# Patient Record
Sex: MALE | Race: WHITE | NOT HISPANIC OR LATINO | URBAN - METROPOLITAN AREA
[De-identification: names, ages, dates, MRNs, and addresses within clinical notes are randomized per-mention and may not be internally consistent; named-entity substitution may affect disease eponyms.]

---

## 2018-01-27 ENCOUNTER — EMERGENCY (EMERGENCY)
Facility: HOSPITAL | Age: 48
LOS: 1 days | Discharge: OTHER INSTITUTION | End: 2018-01-27
Attending: EMERGENCY MEDICINE | Admitting: EMERGENCY MEDICINE
Payer: MEDICAID

## 2018-01-27 VITALS
HEART RATE: 86 BPM | SYSTOLIC BLOOD PRESSURE: 146 MMHG | HEIGHT: 70 IN | TEMPERATURE: 98 F | WEIGHT: 173.06 LBS | RESPIRATION RATE: 18 BRPM | DIASTOLIC BLOOD PRESSURE: 78 MMHG | OXYGEN SATURATION: 98 %

## 2018-01-27 DIAGNOSIS — R69 ILLNESS, UNSPECIFIED: ICD-10-CM

## 2018-01-27 DIAGNOSIS — F31.63 BIPOLAR DISORDER, CURRENT EPISODE MIXED, SEVERE, WITHOUT PSYCHOTIC FEATURES: ICD-10-CM

## 2018-01-27 DIAGNOSIS — F39 UNSPECIFIED MOOD [AFFECTIVE] DISORDER: ICD-10-CM

## 2018-01-27 LAB
ALBUMIN SERPL ELPH-MCNC: 4.7 G/DL — SIGNIFICANT CHANGE UP (ref 3.3–5.2)
ALP SERPL-CCNC: 73 U/L — SIGNIFICANT CHANGE UP (ref 40–120)
ALT FLD-CCNC: 17 U/L — SIGNIFICANT CHANGE UP
AMPHET UR-MCNC: NEGATIVE — SIGNIFICANT CHANGE UP
ANION GAP SERPL CALC-SCNC: 14 MMOL/L — SIGNIFICANT CHANGE UP (ref 5–17)
APAP SERPL-MCNC: <7.5 UG/ML — LOW (ref 10–26)
APPEARANCE UR: CLEAR — SIGNIFICANT CHANGE UP
AST SERPL-CCNC: 14 U/L — SIGNIFICANT CHANGE UP
BACTERIA # UR AUTO: ABNORMAL
BARBITURATES UR SCN-MCNC: NEGATIVE — SIGNIFICANT CHANGE UP
BASOPHILS # BLD AUTO: 0 K/UL — SIGNIFICANT CHANGE UP (ref 0–0.2)
BASOPHILS NFR BLD AUTO: 0.2 % — SIGNIFICANT CHANGE UP (ref 0–2)
BENZODIAZ UR-MCNC: NEGATIVE — SIGNIFICANT CHANGE UP
BILIRUB SERPL-MCNC: 0.4 MG/DL — SIGNIFICANT CHANGE UP (ref 0.4–2)
BILIRUB UR-MCNC: NEGATIVE — SIGNIFICANT CHANGE UP
BUN SERPL-MCNC: 21 MG/DL — HIGH (ref 8–20)
CALCIUM SERPL-MCNC: 9.8 MG/DL — SIGNIFICANT CHANGE UP (ref 8.6–10.2)
CHLORIDE SERPL-SCNC: 98 MMOL/L — SIGNIFICANT CHANGE UP (ref 98–107)
CO2 SERPL-SCNC: 26 MMOL/L — SIGNIFICANT CHANGE UP (ref 22–29)
COCAINE METAB.OTHER UR-MCNC: NEGATIVE — SIGNIFICANT CHANGE UP
COLOR SPEC: YELLOW — SIGNIFICANT CHANGE UP
CREAT SERPL-MCNC: 0.94 MG/DL — SIGNIFICANT CHANGE UP (ref 0.5–1.3)
DIFF PNL FLD: NEGATIVE — SIGNIFICANT CHANGE UP
EOSINOPHIL # BLD AUTO: 0.5 K/UL — SIGNIFICANT CHANGE UP (ref 0–0.5)
EOSINOPHIL NFR BLD AUTO: 3.7 % — SIGNIFICANT CHANGE UP (ref 0–5)
EPI CELLS # UR: NEGATIVE — SIGNIFICANT CHANGE UP
ETHANOL SERPL-MCNC: <10 MG/DL — SIGNIFICANT CHANGE UP
GLUCOSE SERPL-MCNC: 95 MG/DL — SIGNIFICANT CHANGE UP (ref 70–115)
GLUCOSE UR QL: NEGATIVE MG/DL — SIGNIFICANT CHANGE UP
HCT VFR BLD CALC: 42.6 % — SIGNIFICANT CHANGE UP (ref 42–52)
HGB BLD-MCNC: 14.7 G/DL — SIGNIFICANT CHANGE UP (ref 14–18)
KETONES UR-MCNC: NEGATIVE — SIGNIFICANT CHANGE UP
LEUKOCYTE ESTERASE UR-ACNC: NEGATIVE — SIGNIFICANT CHANGE UP
LYMPHOCYTES # BLD AUTO: 16 % — LOW (ref 20–55)
LYMPHOCYTES # BLD AUTO: 2.3 K/UL — SIGNIFICANT CHANGE UP (ref 1–4.8)
MCHC RBC-ENTMCNC: 30.1 PG — SIGNIFICANT CHANGE UP (ref 27–31)
MCHC RBC-ENTMCNC: 34.5 G/DL — SIGNIFICANT CHANGE UP (ref 32–36)
MCV RBC AUTO: 87.3 FL — SIGNIFICANT CHANGE UP (ref 80–94)
METHADONE UR-MCNC: NEGATIVE — SIGNIFICANT CHANGE UP
MONOCYTES # BLD AUTO: 0.9 K/UL — HIGH (ref 0–0.8)
MONOCYTES NFR BLD AUTO: 6.4 % — SIGNIFICANT CHANGE UP (ref 3–10)
NEUTROPHILS # BLD AUTO: 10.3 K/UL — HIGH (ref 1.8–8)
NEUTROPHILS NFR BLD AUTO: 72.9 % — SIGNIFICANT CHANGE UP (ref 37–73)
NITRITE UR-MCNC: NEGATIVE — SIGNIFICANT CHANGE UP
OPIATES UR-MCNC: NEGATIVE — SIGNIFICANT CHANGE UP
PCP SPEC-MCNC: SIGNIFICANT CHANGE UP
PCP UR-MCNC: NEGATIVE — SIGNIFICANT CHANGE UP
PH UR: 6 — SIGNIFICANT CHANGE UP (ref 5–8)
PLATELET # BLD AUTO: 304 K/UL — SIGNIFICANT CHANGE UP (ref 150–400)
POTASSIUM SERPL-MCNC: 4.2 MMOL/L — SIGNIFICANT CHANGE UP (ref 3.5–5.3)
POTASSIUM SERPL-SCNC: 4.2 MMOL/L — SIGNIFICANT CHANGE UP (ref 3.5–5.3)
PROT SERPL-MCNC: 7.4 G/DL — SIGNIFICANT CHANGE UP (ref 6.6–8.7)
PROT UR-MCNC: 15 MG/DL
RBC # BLD: 4.88 M/UL — SIGNIFICANT CHANGE UP (ref 4.6–6.2)
RBC # FLD: 12.3 % — SIGNIFICANT CHANGE UP (ref 11–15.6)
RBC CASTS # UR COMP ASSIST: SIGNIFICANT CHANGE UP /HPF (ref 0–4)
SALICYLATES SERPL-MCNC: <0.6 MG/DL — LOW (ref 10–20)
SODIUM SERPL-SCNC: 138 MMOL/L — SIGNIFICANT CHANGE UP (ref 135–145)
SP GR SPEC: 1.02 — SIGNIFICANT CHANGE UP (ref 1.01–1.02)
THC UR QL: NEGATIVE — SIGNIFICANT CHANGE UP
TSH SERPL-MCNC: 4.1 UIU/ML — SIGNIFICANT CHANGE UP (ref 0.27–4.2)
UROBILINOGEN FLD QL: NEGATIVE MG/DL — SIGNIFICANT CHANGE UP
WBC # BLD: 14.2 K/UL — HIGH (ref 4.8–10.8)
WBC # FLD AUTO: 14.2 K/UL — HIGH (ref 4.8–10.8)
WBC UR QL: SIGNIFICANT CHANGE UP

## 2018-01-27 PROCEDURE — 84443 ASSAY THYROID STIM HORMONE: CPT

## 2018-01-27 PROCEDURE — 36415 COLL VENOUS BLD VENIPUNCTURE: CPT

## 2018-01-27 PROCEDURE — 80053 COMPREHEN METABOLIC PANEL: CPT

## 2018-01-27 PROCEDURE — 80307 DRUG TEST PRSMV CHEM ANLYZR: CPT

## 2018-01-27 PROCEDURE — 99285 EMERGENCY DEPT VISIT HI MDM: CPT

## 2018-01-27 PROCEDURE — 81001 URINALYSIS AUTO W/SCOPE: CPT

## 2018-01-27 PROCEDURE — 93005 ELECTROCARDIOGRAM TRACING: CPT

## 2018-01-27 PROCEDURE — 99285 EMERGENCY DEPT VISIT HI MDM: CPT | Mod: 25

## 2018-01-27 PROCEDURE — 93010 ELECTROCARDIOGRAM REPORT: CPT

## 2018-01-27 PROCEDURE — 85027 COMPLETE CBC AUTOMATED: CPT

## 2018-01-27 PROCEDURE — 90792 PSYCH DIAG EVAL W/MED SRVCS: CPT

## 2018-01-27 RX ORDER — QUETIAPINE FUMARATE 200 MG/1
200 TABLET, FILM COATED ORAL ONCE
Qty: 0 | Refills: 0 | Status: COMPLETED | OUTPATIENT
Start: 2018-01-27 | End: 2018-01-27

## 2018-01-27 RX ADMIN — QUETIAPINE FUMARATE 200 MILLIGRAM(S): 200 TABLET, FILM COATED ORAL at 22:50

## 2018-01-27 NOTE — ED BEHAVIORAL HEALTH ASSESSMENT NOTE - HPI (INCLUDE ILLNESS QUALITY, SEVERITY, DURATION, TIMING, CONTEXT, MODIFYING FACTORS, ASSOCIATED SIGNS AND SYMPTOMS)
Pt alert and awake X 4; arrived c/o +SI and suicidal attempt. tried cutting his arm, superficial LAC to left arm, has a plan to cut himself. denies alcohol or drugs.  suicidal attempt 47-year-old single, non-caregiver, unemployed, on SSD; -American male; domiciles in a motel; with a self-reported history of bipolar disorder and 6 prior suicidal attempts; with a self-reported history of only two prior psychiatric admissions at [facility] last month (from 12/4/17 until 12/14/17 and from 12/21/17 until 1/4/18). Self-referred to the ED; came via bus; c/o +SI and suicidal attempt. States he tried cutting his arm today, superficial LAC to left arm, has a plan to cut himself; reports he took 10 Prozac and 6 vistaril, and states: "I tried to kill myself".

## 2018-01-27 NOTE — ED BEHAVIORAL HEALTH ASSESSMENT NOTE - PRIMARY DX
Mood disorder Bipolar disorder, current episode mixed, severe, without psychotic features Deferred condition on axis II

## 2018-01-27 NOTE — ED ADULT NURSE NOTE - OBJECTIVE STATEMENT
Patient reports he was discharged today Patient reports he was discharged today from [facility] after being there since Wednesday. Stated he was trying to get help for suicidal ideation after using knife to superficially cutting L forearm, but they discharged him "too early" and he wasn't "feeling any better". Reports he went to visit his mother, and she suggested he come to [place] to be evaluated. Patient reports he cut him self on bus on the way to hospital and took "10 Prozac and 6 Vistaril" pills. Reports he still has suicidal ideation, and would take an overdose of his medication. Denied any intent to act on plan while in the hospital. Reports vague auditory hallucinations periodically, and they tell him "just do it, just kill yourself if you're going to..." Evaluating for appropriate disposition.

## 2018-01-27 NOTE — ED BEHAVIORAL HEALTH ASSESSMENT NOTE - DESCRIPTION
+SI HTN See HPI Single, unemployed, non-caregiver; with a history of bipolar; non-compliant with outpatient follow-up.

## 2018-01-27 NOTE — ED BEHAVIORAL HEALTH ASSESSMENT NOTE - SUMMARY
Pt alert and awake X 4; arrived c/o +SI and suicidal attempt. tried cutting his arm, superficial LAC to left arm, has a plan to cut himself. denies alcohol or drugs.  suicidal attempt.      Patient is an =acute risk to self at this time, and require in[patient admission for safety. 47-year-old single, non-caregiver, unemployed, on SSD; -American male; domiciles in a motel; with a self-reported history of bipolar disorder and 6 prior suicidal attempts; with a self-reported history of only two prior psychiatric admissions at [facility] last month (from 12/4/17 until 12/14/17 and from 12/21/17 until 1/4/18). Self-referred to the ED; came via bus; c/o +SI and suicidal attempt. States he tried cutting his arm today, superficial LAC to left arm, has a plan to cut himself; reports he took 10 Prozac and 6 vistaril, and states: "I tried to kill myself".      Patient is an acute risk to self at this time, and require inpatient admission for safety.

## 2018-01-27 NOTE — ED ADULT TRIAGE NOTE - CHIEF COMPLAINT QUOTE
pt alert and awake x3 arrived with SI and attempt. tried cutting his arm, superficial LAC to left arm, has a plan to cut himself. denies alcohol or drugs.

## 2018-01-27 NOTE — ED BEHAVIORAL HEALTH ASSESSMENT NOTE - DETAILS
[facility]; was discharged on 1/4/18. See HPI N/A Reports he took 10 Prozac and 6 vistaril, and states: "I tried to kill myself".

## 2018-01-27 NOTE — ED BEHAVIORAL HEALTH ASSESSMENT NOTE - AXIS IV
Problems with interaction with legal system/Problems with primary support/Occupational problems/Economic problems/Problem related to social environment/Housing problems

## 2018-01-27 NOTE — ED STATDOCS - OBJECTIVE STATEMENT
This is a 47 year old male with hx of depression presenting to the ED c/o worsening depression and self-mutilation today. Pt was recently hospitalized at [place] for suicidal ideation, discharged yesterday. Pt endorses persistent suicidal ideation and today used a knife to cut his L wrist. Denies EtOH use or any other illicit drug use. Currently taking Prozac. Pt currently lives with his mother.

## 2018-01-27 NOTE — ED ADULT NURSE REASSESSMENT NOTE - NS ED NURSE REASSESS COMMENT FT1
Patient has been resting comfortably, watched TV and ate dinner. Accepted meds as ordered. Made aware he has been accepted for transfer to [facility], expressed understanding and intent to cooperate with transfer process. Report called to RN on unit, one [place], after EMS called for transport. No distress noted, continuing to monitor and assess.

## 2018-01-28 ENCOUNTER — INPATIENT (INPATIENT)
Facility: HOSPITAL | Age: 48
LOS: 10 days | Discharge: HOME | End: 2018-02-08
Attending: PSYCHIATRY & NEUROLOGY | Admitting: PSYCHIATRY & NEUROLOGY
Payer: MEDICAID

## 2018-01-28 VITALS
OXYGEN SATURATION: 98 % | DIASTOLIC BLOOD PRESSURE: 80 MMHG | SYSTOLIC BLOOD PRESSURE: 127 MMHG | RESPIRATION RATE: 18 BRPM | HEART RATE: 90 BPM | TEMPERATURE: 98 F

## 2018-01-28 VITALS — TEMPERATURE: 98 F | HEIGHT: 70 IN | WEIGHT: 173.06 LBS

## 2018-01-28 DIAGNOSIS — F33.1 MAJOR DEPRESSIVE DISORDER, RECURRENT, MODERATE: ICD-10-CM

## 2018-01-28 RX ORDER — HYDROXYZINE HCL 10 MG
50 TABLET ORAL EVERY 4 HOURS
Qty: 0 | Refills: 0 | Status: DISCONTINUED | OUTPATIENT
Start: 2018-01-28 | End: 2018-01-28

## 2018-01-28 RX ORDER — LANOLIN ALCOHOL/MO/W.PET/CERES
3 CREAM (GRAM) TOPICAL AT BEDTIME
Qty: 0 | Refills: 0 | Status: DISCONTINUED | OUTPATIENT
Start: 2018-01-28 | End: 2018-02-08

## 2018-01-28 RX ORDER — AMLODIPINE BESYLATE 2.5 MG/1
2.5 TABLET ORAL AT BEDTIME
Qty: 0 | Refills: 0 | Status: DISCONTINUED | OUTPATIENT
Start: 2018-01-28 | End: 2018-01-29

## 2018-01-28 RX ADMIN — AMLODIPINE BESYLATE 2.5 MILLIGRAM(S): 2.5 TABLET ORAL at 20:42

## 2018-01-28 RX ADMIN — Medication 2 MILLIGRAM(S): at 14:00

## 2018-01-28 RX ADMIN — Medication 2 MILLIGRAM(S): at 21:51

## 2018-01-28 RX ADMIN — Medication 3 MILLIGRAM(S): at 21:51

## 2018-01-28 NOTE — ED BEHAVIORAL HEALTH NOTE - BEHAVIORAL HEALTH NOTE
SW Note - late Entry : 1970  DOS:  2018  TRX to [facility]  [address] [place], accepted by Dr. Jared Diaz [facility] on , Attending Dr Gregorio Aguero  Medicare: [id number] no auth needed.    pt transported by [facility] without incident.

## 2018-01-28 NOTE — CHART NOTE - NSCHARTNOTEFT_GEN_A_CORE
Screening Medical Evaluation  Patient Admitted from: [facility] admitting diagnosis: Moderate episode of recurrent major depressive disorder  (PMH) Depression    PAST MEDICAL & SURGICAL HISTORY:  Depression        Allergies    No Known Allergies    Intolerances        Social History:   Denies EtOH use or any other illicit drug use.   Pt currently lives with his mother.    FAMILY HISTORY:      MEDICATIONS  (STANDING):  amLODIPine   Tablet 2.5 milliGRAM(s) Oral at bedtime    MEDICATIONS  (PRN):  LORazepam     Tablet 2 milliGRAM(s) Oral every 6 hours PRN Agitation  melatonin. 3 milliGRAM(s) Oral at bedtime PRN Insomnia        CAPILLARY BLOOD GLUCOSE        I&O's Summary    Vital Signs Last 24 Hrs  T(C): 36.3 (2018 08:17), Max: 36.8 (2018 19:29)  T(F): 97.3 (2018 08:17), Max: 98.2 (2018 19:29)  HR: 90 (2018 00:26) (75 - 90)  BP: 127/80 (2018 00:26) (117/77 - 127/80)  BP(mean): --  RR: 18 (2018 00:26) (18 - 18)  SpO2: 98% (2018 00:26) (98% - 99%)      PHYSICAL EXAM:  GENERAL: NAD, well-developed  HEAD:  Atraumatic, Normocephalic  EYES: EOMI, PERRLA, conjunctiva and sclera clear  NECK: Supple, No JVD  CHEST/LUNG: Clear to auscultation bilaterally; No wheeze  HEART: Regular rate and rhythm; No murmurs, rubs, or gallops  ABDOMEN: Soft, Nontender, Nondistended; Bowel sounds present  EXTREMITIES:  2+ Peripheral Pulses, No clubbing, cyanosis, or edema  PSYCH: AAOx3  NEUROLOGY: non-focal  SKIN: No rashes or lesions    LABS:                        14.7   14.2  )-----------( 304      ( 2018 20:34 )             42.6         138  |  98  |  21.0<H>  ----------------------------<  95  4.2   |  26.0  |  0.94    Ca    9.8      2018 20:34    TPro  7.4  /  Alb  4.7  /  TBili  0.4  /  DBili  x   /  AST  14  /  ALT  17  /  AlkPhos  73            Urinalysis Basic - ( 2018 16:25 )    Color: Yellow / Appearance: Clear / S.020 / pH: x  Gluc: x / Ketone: Negative  / Bili: Negative / Urobili: Negative mg/dL   Blood: x / Protein: 15 mg/dL / Nitrite: Negative   Leuk Esterase: Negative / RBC: 0-2 /HPF / WBC 0-2   Sq Epi: x / Non Sq Epi: Negative / Bacteria: Occasional        RADIOLOGY & ADDITIONAL TESTS:    Assessment and Plan:  This is a 47 year old male with hx of depression presented to [facility] ED with complaints of  worsening depression and self-mutilation. Pt was recently hospitalized at [place] for suicidal ideation, discharged yesterday. As per chart review, pt endorsed persistent suicidal ideation and today used a knife to cut his L wrist. This is a 47 year old male with hx of depression presenting to the ED c/o worsening depression and self-mutilation today. Pt was recently hospitalized at [place] for suicidal ideation, discharged yesterday. Pt endorses persistent suicidal ideation and today used a knife to cut his L wrist. Medical hx: Screening Medical Evaluation  Patient Admitted from: [facility] admitting diagnosis: Moderate episode of recurrent major depressive disorder  (PMH) Depression    PAST MEDICAL & SURGICAL HISTORY:  Depression  Hypertension     Allergies    No Known Allergies    Intolerances    Social History:   Denies EtOH use or any other illicit drug use.  Quit cigarettes in October  Pt currently lives with his mother.    FAMILY HISTORY: maternal:  bipolar disorder     paternal:  unknown      MEDICATIONS  (STANDING):  amLODIPine   Tablet 2.5 milliGRAM(s) Oral at bedtime    MEDICATIONS  (PRN):  LORazepam     Tablet 2 milliGRAM(s) Oral every 6 hours PRN Agitation  melatonin. 3 milliGRAM(s) Oral at bedtime PRN Insomnia      CAPILLARY BLOOD GLUCOSE    I&O's Summary    Vital Signs Last 24 Hrs  T(C): 36.3 (2018 08:17), Max: 36.8 (2018 19:29)  T(F): 97.3 (2018 08:17), Max: 98.2 (2018 19:29)  HR: 90 (2018 00:26) (75 - 90)  BP: 127/80 (2018 00:26) (117/77 - 127/80)  BP(mean): --  RR: 18 (2018 00:26) (18 - 18)  SpO2: 98% (2018 00:26) (98% - 99%)      PHYSICAL EXAM:  GENERAL: NAD, well-developed  HEAD:  Atraumatic, Normocephalic  EYES: EOMI, PERRLA, conjunctiva and sclera clear  NECK: Supple, No JVD  CHEST/LUNG: Clear to auscultation bilaterally; No wheeze  HEART: Regular rate and rhythm; No murmurs, rubs, or gallops  ABDOMEN: Soft, Nontender, Nondistended; Bowel sounds present  EXTREMITIES:  2+ Peripheral Pulses, No clubbing, cyanosis, or edema  PSYCH: AAOx3  NEUROLOGY: non-focal  SKIN: left arm with multiple superficial self-inflicted lacerations     LABS:                        14.7   14.2  )-----------( 304      ( 2018 20:34 )             42.6         138  |  98  |  21.0<H>  ----------------------------<  95  4.2   |  26.0  |  0.94    Ca    9.8      2018 20:34    TPro  7.4  /  Alb  4.7  /  TBili  0.4  /  DBili  x   /  AST  14  /  ALT  17  /  AlkPhos  73            Urinalysis Basic - ( 2018 16:25 )    Color: Yellow / Appearance: Clear / S.020 / pH: x  Gluc: x / Ketone: Negative  / Bili: Negative / Urobili: Negative mg/dL   Blood: x / Protein: 15 mg/dL / Nitrite: Negative   Leuk Esterase: Negative / RBC: 0-2 /HPF / WBC 0-2   Sq Epi: x / Non Sq Epi: Negative / Bacteria: Occasional        RADIOLOGY & ADDITIONAL TESTS:    Assessment and Plan:  This is a 47 year old male with hx of depression presented to [facility] ED with complaints of  worsening depression and self-mutilation. Pt was recently hospitalized at [place] for suicidal ideation, discharged yesterday. As per chart review, pt endorsed persistent suicidal ideation and today used a knife to cut his L wrist. Medical history of hypertension, no surgical history.  Physical exam unremarkable.  ROS negative.  Pt denies chest pain, SOB, palpitations, N/V, dizziness, headaches.  Pt transferred to [facility] for further evaluation and safe environment.     MDD - continue with psych recommendations    Hypertension - continue with amlodipine 2.5mg tab qhs

## 2018-01-29 PROCEDURE — 99232 SBSQ HOSP IP/OBS MODERATE 35: CPT

## 2018-01-29 RX ORDER — AMLODIPINE BESYLATE 2.5 MG/1
2.5 TABLET ORAL ONCE
Qty: 0 | Refills: 0 | Status: COMPLETED | OUTPATIENT
Start: 2018-01-29 | End: 2018-01-29

## 2018-01-29 RX ORDER — AMLODIPINE BESYLATE 2.5 MG/1
2.5 TABLET ORAL DAILY
Qty: 0 | Refills: 0 | Status: DISCONTINUED | OUTPATIENT
Start: 2018-01-30 | End: 2018-02-08

## 2018-01-29 RX ORDER — DIVALPROEX SODIUM 500 MG/1
500 TABLET, DELAYED RELEASE ORAL AT BEDTIME
Qty: 0 | Refills: 0 | Status: DISCONTINUED | OUTPATIENT
Start: 2018-01-29 | End: 2018-01-30

## 2018-01-29 RX ADMIN — Medication 1 MILLIGRAM(S): at 18:12

## 2018-01-29 RX ADMIN — AMLODIPINE BESYLATE 2.5 MILLIGRAM(S): 2.5 TABLET ORAL at 12:34

## 2018-01-29 RX ADMIN — Medication 1 MILLIGRAM(S): at 12:30

## 2018-01-29 RX ADMIN — DIVALPROEX SODIUM 500 MILLIGRAM(S): 500 TABLET, DELAYED RELEASE ORAL at 20:40

## 2018-01-29 RX ADMIN — Medication 3 MILLIGRAM(S): at 20:44

## 2018-01-30 PROCEDURE — 99232 SBSQ HOSP IP/OBS MODERATE 35: CPT

## 2018-01-30 RX ORDER — DIVALPROEX SODIUM 500 MG/1
750 TABLET, DELAYED RELEASE ORAL EVERY OTHER DAY
Qty: 0 | Refills: 0 | Status: DISCONTINUED | OUTPATIENT
Start: 2018-01-30 | End: 2018-01-31

## 2018-01-30 RX ADMIN — Medication 3 MILLIGRAM(S): at 21:06

## 2018-01-30 RX ADMIN — Medication 1 MILLIGRAM(S): at 16:38

## 2018-01-30 RX ADMIN — AMLODIPINE BESYLATE 2.5 MILLIGRAM(S): 2.5 TABLET ORAL at 08:47

## 2018-01-30 RX ADMIN — Medication 1 MILLIGRAM(S): at 12:44

## 2018-01-30 RX ADMIN — Medication 1 MILLIGRAM(S): at 21:51

## 2018-01-30 RX ADMIN — Medication 1 MILLIGRAM(S): at 00:14

## 2018-01-31 PROCEDURE — 99232 SBSQ HOSP IP/OBS MODERATE 35: CPT

## 2018-01-31 RX ORDER — DIVALPROEX SODIUM 500 MG/1
500 TABLET, DELAYED RELEASE ORAL THREE TIMES A DAY
Qty: 0 | Refills: 0 | Status: DISCONTINUED | OUTPATIENT
Start: 2018-02-01 | End: 2018-02-02

## 2018-01-31 RX ORDER — DIVALPROEX SODIUM 500 MG/1
500 TABLET, DELAYED RELEASE ORAL AT BEDTIME
Qty: 0 | Refills: 0 | Status: COMPLETED | OUTPATIENT
Start: 2018-01-31 | End: 2018-01-31

## 2018-01-31 RX ORDER — MIRTAZAPINE 45 MG/1
7.5 TABLET, ORALLY DISINTEGRATING ORAL AT BEDTIME
Qty: 0 | Refills: 0 | Status: DISCONTINUED | OUTPATIENT
Start: 2018-01-31 | End: 2018-02-08

## 2018-01-31 RX ADMIN — DIVALPROEX SODIUM 500 MILLIGRAM(S): 500 TABLET, DELAYED RELEASE ORAL at 22:11

## 2018-01-31 RX ADMIN — Medication 1 MILLIGRAM(S): at 16:15

## 2018-01-31 RX ADMIN — MIRTAZAPINE 7.5 MILLIGRAM(S): 45 TABLET, ORALLY DISINTEGRATING ORAL at 22:12

## 2018-01-31 RX ADMIN — Medication 3 MILLIGRAM(S): at 21:32

## 2018-01-31 RX ADMIN — Medication 1 MILLIGRAM(S): at 21:44

## 2018-01-31 RX ADMIN — DIVALPROEX SODIUM 750 MILLIGRAM(S): 500 TABLET, DELAYED RELEASE ORAL at 10:16

## 2018-01-31 RX ADMIN — AMLODIPINE BESYLATE 2.5 MILLIGRAM(S): 2.5 TABLET ORAL at 10:16

## 2018-02-01 PROCEDURE — 99232 SBSQ HOSP IP/OBS MODERATE 35: CPT

## 2018-02-01 RX ORDER — ACETAMINOPHEN 500 MG
650 TABLET ORAL EVERY 6 HOURS
Qty: 0 | Refills: 0 | Status: DISCONTINUED | OUTPATIENT
Start: 2018-02-01 | End: 2018-02-08

## 2018-02-01 RX ORDER — LORATADINE 10 MG/1
10 TABLET ORAL ONCE
Qty: 0 | Refills: 0 | Status: COMPLETED | OUTPATIENT
Start: 2018-02-01 | End: 2018-02-01

## 2018-02-01 RX ORDER — LORATADINE 10 MG/1
10 TABLET ORAL DAILY
Qty: 0 | Refills: 0 | Status: DISCONTINUED | OUTPATIENT
Start: 2018-02-02 | End: 2018-02-08

## 2018-02-01 RX ADMIN — DIVALPROEX SODIUM 500 MILLIGRAM(S): 500 TABLET, DELAYED RELEASE ORAL at 09:39

## 2018-02-01 RX ADMIN — DIVALPROEX SODIUM 500 MILLIGRAM(S): 500 TABLET, DELAYED RELEASE ORAL at 22:06

## 2018-02-01 RX ADMIN — MIRTAZAPINE 7.5 MILLIGRAM(S): 45 TABLET, ORALLY DISINTEGRATING ORAL at 22:06

## 2018-02-01 RX ADMIN — DIVALPROEX SODIUM 500 MILLIGRAM(S): 500 TABLET, DELAYED RELEASE ORAL at 13:29

## 2018-02-01 RX ADMIN — AMLODIPINE BESYLATE 2.5 MILLIGRAM(S): 2.5 TABLET ORAL at 09:39

## 2018-02-01 RX ADMIN — LORATADINE 10 MILLIGRAM(S): 10 TABLET ORAL at 14:34

## 2018-02-02 PROCEDURE — 99232 SBSQ HOSP IP/OBS MODERATE 35: CPT

## 2018-02-02 RX ORDER — DIVALPROEX SODIUM 500 MG/1
500 TABLET, DELAYED RELEASE ORAL AT BEDTIME
Qty: 0 | Refills: 0 | Status: COMPLETED | OUTPATIENT
Start: 2018-02-02 | End: 2018-02-02

## 2018-02-02 RX ORDER — DIVALPROEX SODIUM 500 MG/1
1000 TABLET, DELAYED RELEASE ORAL AT BEDTIME
Qty: 0 | Refills: 0 | Status: DISCONTINUED | OUTPATIENT
Start: 2018-02-03 | End: 2018-02-08

## 2018-02-02 RX ORDER — INFLUENZA VIRUS VACCINE 15; 15; 15; 15 UG/.5ML; UG/.5ML; UG/.5ML; UG/.5ML
0.5 SUSPENSION INTRAMUSCULAR ONCE
Qty: 0 | Refills: 0 | Status: COMPLETED | OUTPATIENT
Start: 2018-02-02 | End: 2018-02-03

## 2018-02-02 RX ORDER — DIVALPROEX SODIUM 500 MG/1
500 TABLET, DELAYED RELEASE ORAL DAILY
Qty: 0 | Refills: 0 | Status: DISCONTINUED | OUTPATIENT
Start: 2018-02-03 | End: 2018-02-08

## 2018-02-02 RX ADMIN — DIVALPROEX SODIUM 500 MILLIGRAM(S): 500 TABLET, DELAYED RELEASE ORAL at 08:45

## 2018-02-02 RX ADMIN — DIVALPROEX SODIUM 500 MILLIGRAM(S): 500 TABLET, DELAYED RELEASE ORAL at 12:48

## 2018-02-02 RX ADMIN — LORATADINE 10 MILLIGRAM(S): 10 TABLET ORAL at 08:45

## 2018-02-02 RX ADMIN — DIVALPROEX SODIUM 500 MILLIGRAM(S): 500 TABLET, DELAYED RELEASE ORAL at 20:55

## 2018-02-02 RX ADMIN — AMLODIPINE BESYLATE 2.5 MILLIGRAM(S): 2.5 TABLET ORAL at 08:45

## 2018-02-02 RX ADMIN — MIRTAZAPINE 7.5 MILLIGRAM(S): 45 TABLET, ORALLY DISINTEGRATING ORAL at 20:56

## 2018-02-03 RX ADMIN — Medication 650 MILLIGRAM(S): at 19:00

## 2018-02-03 RX ADMIN — DIVALPROEX SODIUM 500 MILLIGRAM(S): 500 TABLET, DELAYED RELEASE ORAL at 09:15

## 2018-02-03 RX ADMIN — MIRTAZAPINE 7.5 MILLIGRAM(S): 45 TABLET, ORALLY DISINTEGRATING ORAL at 21:01

## 2018-02-03 RX ADMIN — INFLUENZA VIRUS VACCINE 0.5 MILLILITER(S): 15; 15; 15; 15 SUSPENSION INTRAMUSCULAR at 12:11

## 2018-02-03 RX ADMIN — AMLODIPINE BESYLATE 2.5 MILLIGRAM(S): 2.5 TABLET ORAL at 09:15

## 2018-02-03 RX ADMIN — Medication 650 MILLIGRAM(S): at 20:01

## 2018-02-03 RX ADMIN — DIVALPROEX SODIUM 1000 MILLIGRAM(S): 500 TABLET, DELAYED RELEASE ORAL at 21:01

## 2018-02-03 RX ADMIN — Medication 1 MILLIGRAM(S): at 22:28

## 2018-02-03 RX ADMIN — LORATADINE 10 MILLIGRAM(S): 10 TABLET ORAL at 09:15

## 2018-02-03 RX ADMIN — Medication 650 MILLIGRAM(S): at 13:45

## 2018-02-04 RX ADMIN — AMLODIPINE BESYLATE 2.5 MILLIGRAM(S): 2.5 TABLET ORAL at 10:00

## 2018-02-04 RX ADMIN — DIVALPROEX SODIUM 500 MILLIGRAM(S): 500 TABLET, DELAYED RELEASE ORAL at 10:00

## 2018-02-04 RX ADMIN — MIRTAZAPINE 7.5 MILLIGRAM(S): 45 TABLET, ORALLY DISINTEGRATING ORAL at 21:03

## 2018-02-04 RX ADMIN — DIVALPROEX SODIUM 1000 MILLIGRAM(S): 500 TABLET, DELAYED RELEASE ORAL at 21:03

## 2018-02-04 RX ADMIN — LORATADINE 10 MILLIGRAM(S): 10 TABLET ORAL at 10:00

## 2018-02-05 LAB
ALBUMIN SERPL ELPH-MCNC: 4.3 G/DL — SIGNIFICANT CHANGE UP (ref 3.3–5)
ALP SERPL-CCNC: 61 U/L — SIGNIFICANT CHANGE UP (ref 40–120)
ALT FLD-CCNC: 19 U/L — SIGNIFICANT CHANGE UP (ref 4–41)
AST SERPL-CCNC: 12 U/L — SIGNIFICANT CHANGE UP (ref 4–40)
BASOPHILS # BLD AUTO: 0.05 K/UL — SIGNIFICANT CHANGE UP (ref 0–0.2)
BASOPHILS NFR BLD AUTO: 0.5 % — SIGNIFICANT CHANGE UP (ref 0–2)
BILIRUB SERPL-MCNC: 0.2 MG/DL — SIGNIFICANT CHANGE UP (ref 0.2–1.2)
BUN SERPL-MCNC: 14 MG/DL — SIGNIFICANT CHANGE UP (ref 7–23)
CALCIUM SERPL-MCNC: 9.1 MG/DL — SIGNIFICANT CHANGE UP (ref 8.4–10.5)
CHLORIDE SERPL-SCNC: 102 MMOL/L — SIGNIFICANT CHANGE UP (ref 98–107)
CO2 SERPL-SCNC: 28 MMOL/L — SIGNIFICANT CHANGE UP (ref 22–31)
CREAT SERPL-MCNC: 0.97 MG/DL — SIGNIFICANT CHANGE UP (ref 0.5–1.3)
EOSINOPHIL # BLD AUTO: 0.55 K/UL — HIGH (ref 0–0.5)
EOSINOPHIL NFR BLD AUTO: 5.5 % — SIGNIFICANT CHANGE UP (ref 0–6)
GLUCOSE SERPL-MCNC: 83 MG/DL — SIGNIFICANT CHANGE UP (ref 70–99)
HCT VFR BLD CALC: 42.6 % — SIGNIFICANT CHANGE UP (ref 39–50)
HGB BLD-MCNC: 14.8 G/DL — SIGNIFICANT CHANGE UP (ref 13–17)
IMM GRANULOCYTES # BLD AUTO: 0.14 # — SIGNIFICANT CHANGE UP
IMM GRANULOCYTES NFR BLD AUTO: 1.4 % — SIGNIFICANT CHANGE UP (ref 0–1.5)
LYMPHOCYTES # BLD AUTO: 2.53 K/UL — SIGNIFICANT CHANGE UP (ref 1–3.3)
LYMPHOCYTES # BLD AUTO: 25.5 % — SIGNIFICANT CHANGE UP (ref 13–44)
MCHC RBC-ENTMCNC: 30.8 PG — SIGNIFICANT CHANGE UP (ref 27–34)
MCHC RBC-ENTMCNC: 34.7 % — SIGNIFICANT CHANGE UP (ref 32–36)
MCV RBC AUTO: 88.8 FL — SIGNIFICANT CHANGE UP (ref 80–100)
MONOCYTES # BLD AUTO: 0.51 K/UL — SIGNIFICANT CHANGE UP (ref 0–0.9)
MONOCYTES NFR BLD AUTO: 5.1 % — SIGNIFICANT CHANGE UP (ref 2–14)
NEUTROPHILS # BLD AUTO: 6.13 K/UL — SIGNIFICANT CHANGE UP (ref 1.8–7.4)
NEUTROPHILS NFR BLD AUTO: 62 % — SIGNIFICANT CHANGE UP (ref 43–77)
NRBC # FLD: 0 — SIGNIFICANT CHANGE UP
PLATELET # BLD AUTO: 304 K/UL — SIGNIFICANT CHANGE UP (ref 150–400)
PMV BLD: 10.8 FL — SIGNIFICANT CHANGE UP (ref 7–13)
POTASSIUM SERPL-MCNC: 4.1 MMOL/L — SIGNIFICANT CHANGE UP (ref 3.5–5.3)
POTASSIUM SERPL-SCNC: 4.1 MMOL/L — SIGNIFICANT CHANGE UP (ref 3.5–5.3)
PROT SERPL-MCNC: 6.7 G/DL — SIGNIFICANT CHANGE UP (ref 6–8.3)
RBC # BLD: 4.8 M/UL — SIGNIFICANT CHANGE UP (ref 4.2–5.8)
RBC # FLD: 11.9 % — SIGNIFICANT CHANGE UP (ref 10.3–14.5)
SODIUM SERPL-SCNC: 143 MMOL/L — SIGNIFICANT CHANGE UP (ref 135–145)
VALPROATE SERPL-MCNC: 89.8 UG/ML — SIGNIFICANT CHANGE UP (ref 50–100)
WBC # BLD: 9.91 K/UL — SIGNIFICANT CHANGE UP (ref 3.8–10.5)
WBC # FLD AUTO: 9.91 K/UL — SIGNIFICANT CHANGE UP (ref 3.8–10.5)

## 2018-02-05 PROCEDURE — 99232 SBSQ HOSP IP/OBS MODERATE 35: CPT

## 2018-02-05 RX ADMIN — Medication 1 MILLIGRAM(S): at 22:20

## 2018-02-05 RX ADMIN — LORATADINE 10 MILLIGRAM(S): 10 TABLET ORAL at 09:20

## 2018-02-05 RX ADMIN — AMLODIPINE BESYLATE 2.5 MILLIGRAM(S): 2.5 TABLET ORAL at 09:20

## 2018-02-05 RX ADMIN — DIVALPROEX SODIUM 500 MILLIGRAM(S): 500 TABLET, DELAYED RELEASE ORAL at 09:20

## 2018-02-05 RX ADMIN — MIRTAZAPINE 7.5 MILLIGRAM(S): 45 TABLET, ORALLY DISINTEGRATING ORAL at 21:05

## 2018-02-05 RX ADMIN — DIVALPROEX SODIUM 1000 MILLIGRAM(S): 500 TABLET, DELAYED RELEASE ORAL at 21:05

## 2018-02-06 PROCEDURE — 99232 SBSQ HOSP IP/OBS MODERATE 35: CPT

## 2018-02-06 RX ORDER — TRAZODONE HCL 50 MG
50 TABLET ORAL ONCE
Qty: 0 | Refills: 0 | Status: COMPLETED | OUTPATIENT
Start: 2018-02-06 | End: 2018-02-06

## 2018-02-06 RX ORDER — IBUPROFEN 200 MG
400 TABLET ORAL ONCE
Qty: 0 | Refills: 0 | Status: COMPLETED | OUTPATIENT
Start: 2018-02-06 | End: 2018-02-07

## 2018-02-06 RX ADMIN — AMLODIPINE BESYLATE 2.5 MILLIGRAM(S): 2.5 TABLET ORAL at 09:21

## 2018-02-06 RX ADMIN — LORATADINE 10 MILLIGRAM(S): 10 TABLET ORAL at 09:21

## 2018-02-06 RX ADMIN — MIRTAZAPINE 7.5 MILLIGRAM(S): 45 TABLET, ORALLY DISINTEGRATING ORAL at 20:51

## 2018-02-06 RX ADMIN — Medication 50 MILLIGRAM(S): at 22:55

## 2018-02-06 RX ADMIN — DIVALPROEX SODIUM 1000 MILLIGRAM(S): 500 TABLET, DELAYED RELEASE ORAL at 20:51

## 2018-02-06 RX ADMIN — DIVALPROEX SODIUM 500 MILLIGRAM(S): 500 TABLET, DELAYED RELEASE ORAL at 09:21

## 2018-02-07 PROCEDURE — 99232 SBSQ HOSP IP/OBS MODERATE 35: CPT

## 2018-02-07 RX ORDER — IBUPROFEN 200 MG
600 TABLET ORAL EVERY 6 HOURS
Qty: 0 | Refills: 0 | Status: DISCONTINUED | OUTPATIENT
Start: 2018-02-07 | End: 2018-02-08

## 2018-02-07 RX ORDER — DIVALPROEX SODIUM 500 MG/1
1 TABLET, DELAYED RELEASE ORAL
Qty: 42 | Refills: 0 | OUTPATIENT
Start: 2018-02-07 | End: 2018-02-20

## 2018-02-07 RX ORDER — MIRTAZAPINE 45 MG/1
1 TABLET, ORALLY DISINTEGRATING ORAL
Qty: 14 | Refills: 0 | OUTPATIENT
Start: 2018-02-07 | End: 2018-02-20

## 2018-02-07 RX ORDER — TRAZODONE HCL 50 MG
50 TABLET ORAL ONCE
Qty: 0 | Refills: 0 | Status: COMPLETED | OUTPATIENT
Start: 2018-02-07 | End: 2018-02-07

## 2018-02-07 RX ORDER — AMLODIPINE BESYLATE 2.5 MG/1
1 TABLET ORAL
Qty: 14 | Refills: 0 | OUTPATIENT
Start: 2018-02-07 | End: 2018-02-20

## 2018-02-07 RX ADMIN — MIRTAZAPINE 7.5 MILLIGRAM(S): 45 TABLET, ORALLY DISINTEGRATING ORAL at 21:00

## 2018-02-07 RX ADMIN — DIVALPROEX SODIUM 1000 MILLIGRAM(S): 500 TABLET, DELAYED RELEASE ORAL at 21:00

## 2018-02-07 RX ADMIN — Medication 50 MILLIGRAM(S): at 21:50

## 2018-02-07 RX ADMIN — LORATADINE 10 MILLIGRAM(S): 10 TABLET ORAL at 10:28

## 2018-02-07 RX ADMIN — Medication 400 MILLIGRAM(S): at 19:01

## 2018-02-07 RX ADMIN — AMLODIPINE BESYLATE 2.5 MILLIGRAM(S): 2.5 TABLET ORAL at 10:28

## 2018-02-07 RX ADMIN — DIVALPROEX SODIUM 500 MILLIGRAM(S): 500 TABLET, DELAYED RELEASE ORAL at 10:28

## 2018-02-08 VITALS — SYSTOLIC BLOOD PRESSURE: 116 MMHG | DIASTOLIC BLOOD PRESSURE: 65 MMHG | TEMPERATURE: 97 F | HEART RATE: 57 BPM

## 2018-02-08 PROCEDURE — 99239 HOSP IP/OBS DSCHRG MGMT >30: CPT

## 2018-02-08 RX ADMIN — DIVALPROEX SODIUM 500 MILLIGRAM(S): 500 TABLET, DELAYED RELEASE ORAL at 08:54

## 2018-02-08 RX ADMIN — AMLODIPINE BESYLATE 2.5 MILLIGRAM(S): 2.5 TABLET ORAL at 08:54

## 2018-02-08 RX ADMIN — LORATADINE 10 MILLIGRAM(S): 10 TABLET ORAL at 08:54

## 2018-02-26 ENCOUNTER — EMERGENCY (EMERGENCY)
Facility: HOSPITAL | Age: 48
LOS: 1 days | Discharge: HOME | End: 2018-02-26
Attending: EMERGENCY MEDICINE | Admitting: EMERGENCY MEDICINE
Payer: MEDICAID

## 2018-02-26 VITALS
DIASTOLIC BLOOD PRESSURE: 72 MMHG | HEIGHT: 70 IN | TEMPERATURE: 98 F | OXYGEN SATURATION: 93 % | HEART RATE: 90 BPM | WEIGHT: 184.97 LBS | SYSTOLIC BLOOD PRESSURE: 113 MMHG | RESPIRATION RATE: 16 BRPM

## 2018-02-26 LAB
AMPHET UR-MCNC: NEGATIVE — SIGNIFICANT CHANGE UP
APPEARANCE UR: CLEAR — SIGNIFICANT CHANGE UP
BARBITURATES UR SCN-MCNC: NEGATIVE — SIGNIFICANT CHANGE UP
BASOPHILS # BLD AUTO: 0.1 K/UL — SIGNIFICANT CHANGE UP (ref 0–0.2)
BASOPHILS NFR BLD AUTO: 1.1 % — SIGNIFICANT CHANGE UP (ref 0–2)
BENZODIAZ UR-MCNC: NEGATIVE — SIGNIFICANT CHANGE UP
BILIRUB UR-MCNC: NEGATIVE — SIGNIFICANT CHANGE UP
COCAINE METAB.OTHER UR-MCNC: NEGATIVE — SIGNIFICANT CHANGE UP
COLOR SPEC: YELLOW — SIGNIFICANT CHANGE UP
DIFF PNL FLD: NEGATIVE — SIGNIFICANT CHANGE UP
EOSINOPHIL # BLD AUTO: 0.6 K/UL — HIGH (ref 0–0.5)
EOSINOPHIL NFR BLD AUTO: 6.3 % — HIGH (ref 0–6)
GLUCOSE UR QL: NEGATIVE MG/DL — SIGNIFICANT CHANGE UP
HCT VFR BLD CALC: 39 % — SIGNIFICANT CHANGE UP (ref 39–50)
HGB BLD-MCNC: 13.4 G/DL — SIGNIFICANT CHANGE UP (ref 13–17)
KETONES UR-MCNC: NEGATIVE — SIGNIFICANT CHANGE UP
LEUKOCYTE ESTERASE UR-ACNC: NEGATIVE — SIGNIFICANT CHANGE UP
LYMPHOCYTES # BLD AUTO: 2.2 K/UL — SIGNIFICANT CHANGE UP (ref 1–3.3)
LYMPHOCYTES # BLD AUTO: 21.7 % — SIGNIFICANT CHANGE UP (ref 13–44)
MCHC RBC-ENTMCNC: 30.2 PG — SIGNIFICANT CHANGE UP (ref 27–34)
MCHC RBC-ENTMCNC: 34.4 GM/DL — SIGNIFICANT CHANGE UP (ref 32–36)
MCV RBC AUTO: 87.7 FL — SIGNIFICANT CHANGE UP (ref 80–100)
METHADONE UR-MCNC: NEGATIVE — SIGNIFICANT CHANGE UP
MONOCYTES # BLD AUTO: 0.5 K/UL — SIGNIFICANT CHANGE UP (ref 0–0.9)
MONOCYTES NFR BLD AUTO: 4.6 % — SIGNIFICANT CHANGE UP (ref 2–14)
NEUTROPHILS # BLD AUTO: 6.7 K/UL — SIGNIFICANT CHANGE UP (ref 1.8–7.4)
NEUTROPHILS NFR BLD AUTO: 66.4 % — SIGNIFICANT CHANGE UP (ref 43–77)
NITRITE UR-MCNC: NEGATIVE — SIGNIFICANT CHANGE UP
OPIATES UR-MCNC: NEGATIVE — SIGNIFICANT CHANGE UP
PCP SPEC-MCNC: SIGNIFICANT CHANGE UP
PCP UR-MCNC: NEGATIVE — SIGNIFICANT CHANGE UP
PH UR: 7 — SIGNIFICANT CHANGE UP (ref 5–8)
PLATELET # BLD AUTO: 212 K/UL — SIGNIFICANT CHANGE UP (ref 150–400)
PROT UR-MCNC: NEGATIVE MG/DL — SIGNIFICANT CHANGE UP
RBC # BLD: 4.44 M/UL — SIGNIFICANT CHANGE UP (ref 4.2–5.8)
RBC # FLD: 12.1 % — SIGNIFICANT CHANGE UP (ref 10.3–14.5)
SP GR SPEC: 1 — LOW (ref 1.01–1.02)
THC UR QL: NEGATIVE — SIGNIFICANT CHANGE UP
UROBILINOGEN FLD QL: NEGATIVE MG/DL — SIGNIFICANT CHANGE UP
WBC # BLD: 10.1 K/UL — SIGNIFICANT CHANGE UP (ref 3.8–10.5)
WBC # FLD AUTO: 10.1 K/UL — SIGNIFICANT CHANGE UP (ref 3.8–10.5)

## 2018-02-26 PROCEDURE — 71046 X-RAY EXAM CHEST 2 VIEWS: CPT | Mod: 26

## 2018-02-26 PROCEDURE — 99284 EMERGENCY DEPT VISIT MOD MDM: CPT

## 2018-02-26 NOTE — ED ADULT NURSE NOTE - NS ED PATIENT SAFETY CONERN FT
pt was going to [facility] in [place]; does not have a home address

## 2018-02-26 NOTE — ED ADULT NURSE NOTE - OBJECTIVE STATEMENT
pt reports he was traveling and got lost. when this happened he became angry an upset. he reports taking 16 divalproex sodium dr 500 mg tablets. he now reports feeling depressed and thoughts of harming himself. he also has scratch marks to his right forearm; he reports "I was trying to hurt myself". he also report drinking "2 beers" earlier tonight. he is requesting a psychiatric evaluation

## 2018-02-26 NOTE — ED PROVIDER NOTE - OBJECTIVE STATEMENT
46 y/o M pt w/ PMHx depression, anxiety, HTN, HLD presents to the ED c/o depression. Pt states he got lost while traveling on the bus, went to [employer]... told an employee that his blood pressure was high and he was getting a headache. Employee called an ambulance to bring pt to ED. Pt states he is depressed because a lot of things in life are not going his way and he felt worse today because he became lost. Pt states he is homeless, was trying to find a shelter to stay at when he became lost, has been staying at a motel. Admits to drinking "two cans" of alcohol today, admits that he is aware he is not supposed to drink alcohol while he is taking his medications. Pt states he cut himself with a spoon because he wanted to hurt himself, denies any plans or suicidal attempts. Pt denies HI, fever, cp, sob or any other complaints at this time.   Psychologist: Dr. Farrar

## 2018-02-26 NOTE — ED PROVIDER NOTE - MEDICAL DECISION MAKING DETAILS
48 yo male with chronic depression brought by police for psych eval after he became lost on way to homeless housing shelter. Stated he tried to cut his arm with a spoon. PE normal except for superficial abrasions. Plan - med clearance and psych eval.

## 2018-02-27 VITALS
OXYGEN SATURATION: 96 % | HEART RATE: 84 BPM | DIASTOLIC BLOOD PRESSURE: 76 MMHG | RESPIRATION RATE: 15 BRPM | SYSTOLIC BLOOD PRESSURE: 112 MMHG

## 2018-02-27 DIAGNOSIS — F43.29 ADJUSTMENT DISORDER WITH OTHER SYMPTOMS: ICD-10-CM

## 2018-02-27 LAB
ALBUMIN SERPL ELPH-MCNC: 4.7 G/DL — SIGNIFICANT CHANGE UP (ref 3.3–5)
ALP SERPL-CCNC: 49 U/L — SIGNIFICANT CHANGE UP (ref 30–120)
ALT FLD-CCNC: 19 U/L DA — SIGNIFICANT CHANGE UP (ref 10–60)
ANION GAP SERPL CALC-SCNC: 10 MMOL/L — SIGNIFICANT CHANGE UP (ref 5–17)
APAP SERPL-MCNC: <1 UG/ML — LOW (ref 10–30)
AST SERPL-CCNC: 13 U/L — SIGNIFICANT CHANGE UP (ref 10–40)
BILIRUB SERPL-MCNC: 0.6 MG/DL — SIGNIFICANT CHANGE UP (ref 0.2–1.2)
BUN SERPL-MCNC: 19 MG/DL — SIGNIFICANT CHANGE UP (ref 7–23)
CALCIUM SERPL-MCNC: 10 MG/DL — SIGNIFICANT CHANGE UP (ref 8.4–10.5)
CHLORIDE SERPL-SCNC: 102 MMOL/L — SIGNIFICANT CHANGE UP (ref 96–108)
CO2 SERPL-SCNC: 31 MMOL/L — SIGNIFICANT CHANGE UP (ref 22–31)
CREAT SERPL-MCNC: 1 MG/DL — SIGNIFICANT CHANGE UP (ref 0.5–1.3)
ETHANOL SERPL-MCNC: 105 MG/DL — HIGH (ref 0–3)
GLUCOSE SERPL-MCNC: 92 MG/DL — SIGNIFICANT CHANGE UP (ref 70–99)
POTASSIUM SERPL-MCNC: 4 MMOL/L — SIGNIFICANT CHANGE UP (ref 3.5–5.3)
POTASSIUM SERPL-SCNC: 4 MMOL/L — SIGNIFICANT CHANGE UP (ref 3.5–5.3)
PROT SERPL-MCNC: 7.8 G/DL — SIGNIFICANT CHANGE UP (ref 6–8.3)
SALICYLATES SERPL-MCNC: 0.6 MG/DL — LOW (ref 2.8–20)
SODIUM SERPL-SCNC: 143 MMOL/L — SIGNIFICANT CHANGE UP (ref 135–145)
VALPROATE SERPL-MCNC: 75 UG/ML — SIGNIFICANT CHANGE UP (ref 50–100)

## 2018-02-27 PROCEDURE — 80164 ASSAY DIPROPYLACETIC ACD TOT: CPT

## 2018-02-27 PROCEDURE — 71046 X-RAY EXAM CHEST 2 VIEWS: CPT

## 2018-02-27 PROCEDURE — 81003 URINALYSIS AUTO W/O SCOPE: CPT

## 2018-02-27 PROCEDURE — 90792 PSYCH DIAG EVAL W/MED SRVCS: CPT | Mod: GT

## 2018-02-27 PROCEDURE — 80053 COMPREHEN METABOLIC PANEL: CPT

## 2018-02-27 PROCEDURE — 80307 DRUG TEST PRSMV CHEM ANLYZR: CPT

## 2018-02-27 PROCEDURE — 93005 ELECTROCARDIOGRAM TRACING: CPT

## 2018-02-27 PROCEDURE — 99285 EMERGENCY DEPT VISIT HI MDM: CPT | Mod: 25

## 2018-02-27 PROCEDURE — 85027 COMPLETE CBC AUTOMATED: CPT

## 2018-02-27 PROCEDURE — 93010 ELECTROCARDIOGRAM REPORT: CPT

## 2018-02-27 RX ORDER — LORATADINE 10 MG/1
1 TABLET ORAL
Qty: 0 | Refills: 0 | COMMUNITY

## 2018-02-27 RX ORDER — AMLODIPINE BESYLATE 2.5 MG/1
0 TABLET ORAL
Qty: 0 | Refills: 0 | COMMUNITY

## 2018-02-27 RX ORDER — DIVALPROEX SODIUM 500 MG/1
0 TABLET, DELAYED RELEASE ORAL
Qty: 0 | Refills: 0 | COMMUNITY

## 2018-02-27 RX ORDER — TRAZODONE HCL 50 MG
0 TABLET ORAL
Qty: 0 | Refills: 0 | COMMUNITY

## 2018-02-27 RX ORDER — AMOXICILLIN 250 MG/5ML
1 SUSPENSION, RECONSTITUTED, ORAL (ML) ORAL
Qty: 0 | Refills: 0 | COMMUNITY

## 2018-02-27 RX ORDER — MIRTAZAPINE 45 MG/1
1 TABLET, ORALLY DISINTEGRATING ORAL
Qty: 0 | Refills: 0 | COMMUNITY

## 2018-02-27 RX ORDER — LAMOTRIGINE 25 MG/1
3 TABLET, ORALLY DISINTEGRATING ORAL
Qty: 0 | Refills: 0 | COMMUNITY

## 2018-02-27 NOTE — ED BEHAVIORAL HEALTH ASSESSMENT NOTE - DETAILS
discharged from a hospital in [place] last week phone handoff, ensure medical clearance due to reported Depakote ingestion and treat as necessary n/a NSSI with cutting himself with a spoon, history of impulsive non-lethal overdoses on his psychotropic medications

## 2018-02-27 NOTE — ED BEHAVIORAL HEALTH ASSESSMENT NOTE - SUMMARY
46 yo chronically homeless (with multiple short-term DSS placements) M with a psychiatric history of an adjustment disorder, a learning disability and multiple psychiatric admissions within the past three months presents to the ED via EMS with complaints of having high blood pressure and a headache, but later endorsed taking a handful of Depakote pills; in the context of getting lost on his way to his DSS placement at [facility]. Currently, he denies any SI / NSSI urges, although is frustrated and upset that he was unable to locate his way earlier [facility]. His reported ingestion of Depakote pills he attributes to taking them to help him calm down, as opposed to an intentional overdose as a suicide attempt. Pt is primarily preoccupied with getting assistance back to his assigned short-term housing. Other contributing variables to his presentation include poor coping skills and low frustration tolerance which have been noted in prior assessments and discharge summaries, as well as some potential secondary gain for basic needs (ie. food and shelter) given his chronic homelessness. There are no acute psychiatric issues at this time that warrants further interventions. He will benefit from  assistance for getting to his housing destination in the morning. Given his reported depakote ingestion, will need to ensure he is medically cleared as well.

## 2018-02-27 NOTE — ED ADULT NURSE REASSESSMENT NOTE - NS ED NURSE REASSESS COMMENT FT1
md aware of results. call placed / message left with [employer] ext. [phone] for evaluation later today. pt sleeping at long intervals. will continue to monitor
pt sleeping on stretcher sleeping 1 to 1 in place pt cleared by psych per night staff and cleared by ed md pt awaiting  for dispo with home
pt spoken with by  and arrangements for d/c   1 to 1 d'c/d  [facility] pt offers no medical c/o pt calm and cooperative lungs clear and equal b/l ascultation
pt ate and tolerated breakfast  taxi in ed to transport pt pt calm and cooperative skin warm and dry pt re evaluated by md and to be d'c/d pt discharged stable and ambulatory in nad at present d/c instruction reinforced and pt verbalized understanding vital signs as charted

## 2018-02-27 NOTE — ED BEHAVIORAL HEALTH ASSESSMENT NOTE - HPI (INCLUDE ILLNESS QUALITY, SEVERITY, DURATION, TIMING, CONTEXT, MODIFYING FACTORS, ASSOCIATED SIGNS AND SYMPTOMS)
46 yo chronically homeless (with multiple short-term DSS placements) M with a psychiatric history of an adjustment disorder, a learning disability and multiple psychiatric admissions within the past three months presents to the ED via EMS with complaints of having high blood pressure and a headache, but later endorsed taking a handful of Depakote pills; in the context of getting lost on his way to his DSS placement at [facility]. Pt cites that after leaving the [facility] office he took several different buses but none of them took him to his destination. He became frustrated, angry and depressed and reports that he took a bunch of Depakote pills. He denies that this was a suicide attempt, but cites thought that because it is his psychiatric medication that if he took more it would make him feel better. Denies any ill intent with his ingestion of pills. Pt cites that he threw up shortly within the hour or so. He eventually made his way to a [employer], when he told staff there that he had a headache and thought his high blood pressure was affecting him so EMS was called. Pt reports feeling a little better now. He's preoccupied with getting back to [facility], his assigned placement. Denies any SI / NSSI urges at this time. No evidence of psychosis or stacy on exam.     Of note, pt was discharged from [facility] after an admission from 1/28/18 - 2/8/18. Upon leaving, he quickly ended up in the hospital again and brought himself back to [facility] and was eventually hospitalized in a [facility]. He was just released last Tuesday (2/20). Pt reports chronic issues with housing and sometimes getting lost to his assigned destinations. Pt states that he simply needs assistance with getting back to [facility]. Prior review of discharge summaries note for his poor frustration tolerance and poor coping skills,

## 2018-02-27 NOTE — ED BEHAVIORAL HEALTH ASSESSMENT NOTE - OTHER PAST PSYCHIATRIC HISTORY (INCLUDE DETAILS REGARDING ONSET, COURSE OF ILLNESS, INPATIENT/OUTPATIENT TREATMENT)
Prior admissions include:   [facility] 12/4/17 – 12/14/17 and diagnosed with “bipolar disorder”   [facility] 12/21/17 – 1/4/18  [facility] 1/24/18 – 1/27/18  [facility] 1/28/18 – 2/8/18; not bipolar,  [facility] mid February ’18 for a week  Patient was diagnosed with bipolar disorder during prior admissions, but that diagnosis was removed during his [facility] admission and given a diagnosis of adjustment disorder and intermittent explosive disorder instead

## 2018-02-27 NOTE — ED BEHAVIORAL HEALTH ASSESSMENT NOTE - RISK ASSESSMENT
Due to his psychiatric comorbidities, poor coping styles and low frustration tolerance, there is some chronic risk of danger to himself, especially when he faces an acute stressor that leads to poor self-regulation and behavioral disinhibition. However, based on his current mental status exam that risk is not acutely elevated at this time; does not require a more restrictive setting of care.

## 2018-04-13 ENCOUNTER — EMERGENCY (EMERGENCY)
Facility: HOSPITAL | Age: 48
LOS: 0 days | Discharge: HOME | End: 2018-04-13
Attending: EMERGENCY MEDICINE | Admitting: EMERGENCY MEDICINE
Payer: MEDICAID

## 2018-04-13 ENCOUNTER — EMERGENCY (EMERGENCY)
Facility: HOSPITAL | Age: 48
LOS: 1 days | Discharge: HOME | End: 2018-04-13
Attending: EMERGENCY MEDICINE
Payer: MEDICAID

## 2018-04-13 VITALS
RESPIRATION RATE: 18 BRPM | SYSTOLIC BLOOD PRESSURE: 152 MMHG | OXYGEN SATURATION: 98 % | WEIGHT: 177.91 LBS | HEART RATE: 86 BPM | TEMPERATURE: 98 F | DIASTOLIC BLOOD PRESSURE: 102 MMHG | HEIGHT: 70 IN

## 2018-04-13 VITALS
RESPIRATION RATE: 18 BRPM | DIASTOLIC BLOOD PRESSURE: 78 MMHG | HEART RATE: 97 BPM | SYSTOLIC BLOOD PRESSURE: 120 MMHG | TEMPERATURE: 98 F | OXYGEN SATURATION: 100 %

## 2018-04-13 VITALS
WEIGHT: 177.91 LBS | RESPIRATION RATE: 18 BRPM | SYSTOLIC BLOOD PRESSURE: 144 MMHG | HEART RATE: 92 BPM | DIASTOLIC BLOOD PRESSURE: 96 MMHG | OXYGEN SATURATION: 97 % | TEMPERATURE: 98 F | HEIGHT: 68 IN

## 2018-04-13 DIAGNOSIS — F32.9 MAJOR DEPRESSIVE DISORDER, SINGLE EPISODE, UNSPECIFIED: ICD-10-CM

## 2018-04-13 DIAGNOSIS — I10 ESSENTIAL (PRIMARY) HYPERTENSION: ICD-10-CM

## 2018-04-13 DIAGNOSIS — F31.70 BIPOLAR DISORDER, CURRENTLY IN REMISSION, MOST RECENT EPISODE UNSPECIFIED: ICD-10-CM

## 2018-04-13 DIAGNOSIS — Z91.14 PATIENT'S OTHER NONCOMPLIANCE WITH MEDICATION REGIMEN: ICD-10-CM

## 2018-04-13 LAB
ALBUMIN SERPL ELPH-MCNC: 4.1 G/DL — SIGNIFICANT CHANGE UP (ref 3.3–5)
ALP SERPL-CCNC: 50 U/L — SIGNIFICANT CHANGE UP (ref 40–120)
ALT FLD-CCNC: 35 U/L — SIGNIFICANT CHANGE UP (ref 12–78)
AMPHET UR-MCNC: NEGATIVE — SIGNIFICANT CHANGE UP
AMPHET UR-MCNC: NEGATIVE — SIGNIFICANT CHANGE UP
ANION GAP SERPL CALC-SCNC: 9 MMOL/L — SIGNIFICANT CHANGE UP (ref 5–17)
APAP SERPL-MCNC: <2 UG/ML — LOW (ref 10–30)
APPEARANCE UR: CLEAR — SIGNIFICANT CHANGE UP
APPEARANCE UR: CLEAR — SIGNIFICANT CHANGE UP
AST SERPL-CCNC: 15 U/L — SIGNIFICANT CHANGE UP (ref 15–37)
BACTERIA # UR AUTO: (no result)
BARBITURATES UR SCN-MCNC: NEGATIVE — SIGNIFICANT CHANGE UP
BARBITURATES UR SCN-MCNC: NEGATIVE — SIGNIFICANT CHANGE UP
BASOPHILS # BLD AUTO: 0.05 K/UL — SIGNIFICANT CHANGE UP (ref 0–0.2)
BASOPHILS NFR BLD AUTO: 0.6 % — SIGNIFICANT CHANGE UP (ref 0–2)
BENZODIAZ UR-MCNC: NEGATIVE — SIGNIFICANT CHANGE UP
BENZODIAZ UR-MCNC: NEGATIVE — SIGNIFICANT CHANGE UP
BILIRUB SERPL-MCNC: 0.4 MG/DL — SIGNIFICANT CHANGE UP (ref 0.2–1.2)
BILIRUB UR-MCNC: NEGATIVE — SIGNIFICANT CHANGE UP
BILIRUB UR-MCNC: NEGATIVE — SIGNIFICANT CHANGE UP
BUN SERPL-MCNC: 18 MG/DL — SIGNIFICANT CHANGE UP (ref 7–23)
CALCIUM SERPL-MCNC: 9.1 MG/DL — SIGNIFICANT CHANGE UP (ref 8.5–10.1)
CHLORIDE SERPL-SCNC: 103 MMOL/L — SIGNIFICANT CHANGE UP (ref 96–108)
CO2 SERPL-SCNC: 27 MMOL/L — SIGNIFICANT CHANGE UP (ref 22–31)
COCAINE METAB.OTHER UR-MCNC: NEGATIVE — SIGNIFICANT CHANGE UP
COCAINE METAB.OTHER UR-MCNC: NEGATIVE — SIGNIFICANT CHANGE UP
COLOR SPEC: YELLOW — SIGNIFICANT CHANGE UP
COLOR SPEC: YELLOW — SIGNIFICANT CHANGE UP
CREAT SERPL-MCNC: 0.84 MG/DL — SIGNIFICANT CHANGE UP (ref 0.5–1.3)
DIFF PNL FLD: NEGATIVE — SIGNIFICANT CHANGE UP
DIFF PNL FLD: NEGATIVE — SIGNIFICANT CHANGE UP
EOSINOPHIL # BLD AUTO: 0.59 K/UL — HIGH (ref 0–0.5)
EOSINOPHIL NFR BLD AUTO: 6.5 % — HIGH (ref 0–6)
EPI CELLS # UR: SIGNIFICANT CHANGE UP
EPI CELLS # UR: SIGNIFICANT CHANGE UP
ETHANOL SERPL-MCNC: <10 MG/DL — SIGNIFICANT CHANGE UP (ref 0–10)
GLUCOSE SERPL-MCNC: 98 MG/DL — SIGNIFICANT CHANGE UP (ref 70–99)
GLUCOSE UR QL: NEGATIVE MG/DL — SIGNIFICANT CHANGE UP
GLUCOSE UR QL: NEGATIVE MG/DL — SIGNIFICANT CHANGE UP
HCT VFR BLD CALC: 41.7 % — SIGNIFICANT CHANGE UP (ref 39–50)
HGB BLD-MCNC: 14.2 G/DL — SIGNIFICANT CHANGE UP (ref 13–17)
IMM GRANULOCYTES NFR BLD AUTO: 1 % — SIGNIFICANT CHANGE UP (ref 0–1.5)
KETONES UR-MCNC: (no result)
KETONES UR-MCNC: ABNORMAL
LEUKOCYTE ESTERASE UR-ACNC: NEGATIVE — SIGNIFICANT CHANGE UP
LEUKOCYTE ESTERASE UR-ACNC: NEGATIVE — SIGNIFICANT CHANGE UP
LYMPHOCYTES # BLD AUTO: 2.58 K/UL — SIGNIFICANT CHANGE UP (ref 1–3.3)
LYMPHOCYTES # BLD AUTO: 28.6 % — SIGNIFICANT CHANGE UP (ref 13–44)
MCHC RBC-ENTMCNC: 30.8 PG — SIGNIFICANT CHANGE UP (ref 27–34)
MCHC RBC-ENTMCNC: 34.1 GM/DL — SIGNIFICANT CHANGE UP (ref 32–36)
MCV RBC AUTO: 90.5 FL — SIGNIFICANT CHANGE UP (ref 80–100)
METHADONE UR-MCNC: NEGATIVE — SIGNIFICANT CHANGE UP
METHADONE UR-MCNC: NEGATIVE — SIGNIFICANT CHANGE UP
MONOCYTES # BLD AUTO: 0.74 K/UL — SIGNIFICANT CHANGE UP (ref 0–0.9)
MONOCYTES NFR BLD AUTO: 8.2 % — SIGNIFICANT CHANGE UP (ref 2–14)
NEUTROPHILS # BLD AUTO: 4.97 K/UL — SIGNIFICANT CHANGE UP (ref 1.8–7.4)
NEUTROPHILS NFR BLD AUTO: 55.1 % — SIGNIFICANT CHANGE UP (ref 43–77)
NITRITE UR-MCNC: NEGATIVE — SIGNIFICANT CHANGE UP
NITRITE UR-MCNC: NEGATIVE — SIGNIFICANT CHANGE UP
NRBC # BLD: 0 /100 WBCS — SIGNIFICANT CHANGE UP (ref 0–0)
OPIATES UR-MCNC: NEGATIVE — SIGNIFICANT CHANGE UP
OPIATES UR-MCNC: NEGATIVE — SIGNIFICANT CHANGE UP
PCP SPEC-MCNC: SIGNIFICANT CHANGE UP
PCP SPEC-MCNC: SIGNIFICANT CHANGE UP
PCP UR-MCNC: NEGATIVE — SIGNIFICANT CHANGE UP
PCP UR-MCNC: NEGATIVE — SIGNIFICANT CHANGE UP
PH UR: 5 — SIGNIFICANT CHANGE UP (ref 5–8)
PH UR: 6.5 — SIGNIFICANT CHANGE UP (ref 5–8)
PLATELET # BLD AUTO: 222 K/UL — SIGNIFICANT CHANGE UP (ref 150–400)
POTASSIUM SERPL-MCNC: 3.9 MMOL/L — SIGNIFICANT CHANGE UP (ref 3.5–5.3)
POTASSIUM SERPL-SCNC: 3.9 MMOL/L — SIGNIFICANT CHANGE UP (ref 3.5–5.3)
PROT SERPL-MCNC: 6.8 GM/DL — SIGNIFICANT CHANGE UP (ref 6–8.3)
PROT UR-MCNC: 15 MG/DL
PROT UR-MCNC: 15 MG/DL
RBC # BLD: 4.61 M/UL — SIGNIFICANT CHANGE UP (ref 4.2–5.8)
RBC # FLD: 13.4 % — SIGNIFICANT CHANGE UP (ref 10.3–14.5)
RBC CASTS # UR COMP ASSIST: NEGATIVE /HPF — SIGNIFICANT CHANGE UP (ref 0–4)
RBC CASTS # UR COMP ASSIST: SIGNIFICANT CHANGE UP /HPF (ref 0–4)
SALICYLATES SERPL-MCNC: <1.7 MG/DL — LOW (ref 2.8–20)
SODIUM SERPL-SCNC: 139 MMOL/L — SIGNIFICANT CHANGE UP (ref 135–145)
SP GR SPEC: 1.02 — SIGNIFICANT CHANGE UP (ref 1.01–1.02)
SP GR SPEC: 1.02 — SIGNIFICANT CHANGE UP (ref 1.01–1.02)
THC UR QL: NEGATIVE — SIGNIFICANT CHANGE UP
THC UR QL: NEGATIVE — SIGNIFICANT CHANGE UP
TSH SERPL-MCNC: 7.89 UIU/ML — HIGH (ref 0.36–3.74)
UROBILINOGEN FLD QL: NEGATIVE MG/DL — SIGNIFICANT CHANGE UP
UROBILINOGEN FLD QL: NEGATIVE MG/DL — SIGNIFICANT CHANGE UP
VALPROATE SERPL-MCNC: 5 UG/ML — LOW (ref 50–100)
WBC # BLD: 9.02 K/UL — SIGNIFICANT CHANGE UP (ref 3.8–10.5)
WBC # FLD AUTO: 9.02 K/UL — SIGNIFICANT CHANGE UP (ref 3.8–10.5)
WBC UR QL: NEGATIVE — SIGNIFICANT CHANGE UP
WBC UR QL: SIGNIFICANT CHANGE UP

## 2018-04-13 PROCEDURE — 81001 URINALYSIS AUTO W/SCOPE: CPT

## 2018-04-13 PROCEDURE — 80307 DRUG TEST PRSMV CHEM ANLYZR: CPT

## 2018-04-13 PROCEDURE — 70450 CT HEAD/BRAIN W/O DYE: CPT

## 2018-04-13 PROCEDURE — 96372 THER/PROPH/DIAG INJ SC/IM: CPT | Mod: XU

## 2018-04-13 PROCEDURE — 90792 PSYCH DIAG EVAL W/MED SRVCS: CPT

## 2018-04-13 PROCEDURE — 70450 CT HEAD/BRAIN W/O DYE: CPT | Mod: 26

## 2018-04-13 PROCEDURE — 99284 EMERGENCY DEPT VISIT MOD MDM: CPT | Mod: 25

## 2018-04-13 PROCEDURE — 70450 CT HEAD/BRAIN W/O DYE: CPT | Mod: 26,77

## 2018-04-13 PROCEDURE — 99284 EMERGENCY DEPT VISIT MOD MDM: CPT

## 2018-04-13 RX ORDER — DIPHENHYDRAMINE HCL 50 MG
25 CAPSULE ORAL ONCE
Qty: 0 | Refills: 0 | Status: COMPLETED | OUTPATIENT
Start: 2018-04-13 | End: 2018-04-13

## 2018-04-13 RX ORDER — METOCLOPRAMIDE HCL 10 MG
10 TABLET ORAL ONCE
Qty: 0 | Refills: 0 | Status: COMPLETED | OUTPATIENT
Start: 2018-04-13 | End: 2018-04-13

## 2018-04-13 RX ORDER — KETOROLAC TROMETHAMINE 30 MG/ML
60 SYRINGE (ML) INJECTION ONCE
Qty: 0 | Refills: 0 | Status: DISCONTINUED | OUTPATIENT
Start: 2018-04-13 | End: 2018-04-13

## 2018-04-13 RX ORDER — AMLODIPINE BESYLATE 2.5 MG/1
10 TABLET ORAL ONCE
Qty: 0 | Refills: 0 | Status: COMPLETED | OUTPATIENT
Start: 2018-04-13 | End: 2018-04-13

## 2018-04-13 RX ORDER — ACETAMINOPHEN 500 MG
650 TABLET ORAL ONCE
Qty: 0 | Refills: 0 | Status: COMPLETED | OUTPATIENT
Start: 2018-04-13 | End: 2018-04-13

## 2018-04-13 RX ADMIN — Medication 25 MILLIGRAM(S): at 21:52

## 2018-04-13 RX ADMIN — Medication 10 MILLIGRAM(S): at 21:52

## 2018-04-13 RX ADMIN — Medication 650 MILLIGRAM(S): at 04:46

## 2018-04-13 RX ADMIN — Medication 60 MILLIGRAM(S): at 21:51

## 2018-04-13 RX ADMIN — AMLODIPINE BESYLATE 10 MILLIGRAM(S): 2.5 TABLET ORAL at 04:46

## 2018-04-13 NOTE — ED PROVIDER NOTE - SIGNIFICANT NEGATIVE FINDINGS
no photophobia, neck stiffness, slurred speech, focal weakness, ataxia, incontinence, confusion, trauma/fall, blood thinners/bleeding disorder

## 2018-04-13 NOTE — ED ADULT NURSE NOTE - OBJECTIVE STATEMENT
Pt presents to ER c/o headache. Pt reports running out of blood pressure and psych medications a few days ago and not being able refill them. Pt reports onset of symptoms began 3 days ago and have been consistent. AO x 3 oriented to baseline, normal breathing pattern with no difficulty. Neuro intact, denies headache/change in vision.

## 2018-04-13 NOTE — ED PROVIDER NOTE - ATTENDING CONTRIBUTION TO CARE
I, Gwyn Andre, performed the initial face to face bedside interview with this patient regarding history of present illness, review of symptoms and relevant past medical, social and family history.  I completed an independent physical examination.  I was the initial provider who evaluated this patient. I have signed out the follow up of any pending tests (i.e. labs, radiological studies) to the ACP.  I have communicated the patient’s plan of care and disposition with the ACP.

## 2018-04-13 NOTE — ED BEHAVIORAL HEALTH ASSESSMENT NOTE - RISK ASSESSMENT
low- no current suicidal thoughts no significant active symptoms non-compliance is a risk factor as is lack of current treatment- in process of scheduling appointment

## 2018-04-13 NOTE — ED ADULT NURSE REASSESSMENT NOTE - NS ED NURSE REASSESS COMMENT FT1
Patient alert, calm and cooperative at this time. Psych consult completed, pending dispo. 1:1 continues for safety. Will continue monitoring patient.

## 2018-04-13 NOTE — ED ADULT NURSE REASSESSMENT NOTE - NS ED NURSE REASSESS COMMENT FT1
Received patient in bed. A&Ox4, 1:1 in progress, belongings checked in security, wanded. Pending psych consult. Will continue monitoring patient.

## 2018-04-13 NOTE — ED PROVIDER NOTE - MEDICAL DECISION MAKING DETAILS
Headache, hx of HTN, on meds, compliant, BP = within acceptable limits  Third ED visit, neuro intact, requesting Neuro referral  unclear w/u, hct to evaluate, treat symptomatically, check results and reassess

## 2018-04-13 NOTE — ED PROVIDER NOTE - OBJECTIVE STATEMENT
[facility] until about three days ago when noted headache, mostly frontal, slight nausea, mostly resolved, episode of vomiting x 1, tolerating po, concerned due to history of HTN, on po meds, compliant as per patient, thinks there may be a relation.  Denies hx of headaches but mother suffers from headaches, no trauma/fall, bleeding disorder, blood thinners.  Denies confusion, slurred speech, focal weakness, ataxia, incontinence, numbness, tingling, vertigo.  Seen at another hospital for same, unclear w/u but he states they did "nothing."  Requesting a neuro referral on discharge.

## 2018-04-13 NOTE — ED BEHAVIORAL HEALTH ASSESSMENT NOTE - OTHER PAST PSYCHIATRIC HISTORY (INCLUDE DETAILS REGARDING ONSET, COURSE OF ILLNESS, INPATIENT/OUTPATIENT TREATMENT)
[facility] 12/17 twice- dx with bipolar disorder  [facility] in [place] in 2/18  [facility] early 2018 [facility] 12/[facility] twice- dx with bipolar disorder  [facility] in [place] in 2/18  [facility] early 2018

## 2018-04-13 NOTE — ED ADULT NURSE NOTE - OBJECTIVE STATEMENT
I have a headache x 3 days went to [facility] yesterday got motrin and some blood work no better

## 2018-04-13 NOTE — ED BEHAVIORAL HEALTH ASSESSMENT NOTE - DETAILS
hx of cutting arms in frustration possible headaches from Depakote mother with bipolar depression headaches Discussed with Dr. Villar

## 2018-04-13 NOTE — ED PROVIDER NOTE - MEDICAL DECISION MAKING DETAILS
Will give tylenol for headache, check labs, CT head and then call psych for consult due to patients depression and fleeting suicidal thoughts without plan.

## 2018-04-13 NOTE — ED BEHAVIORAL HEALTH ASSESSMENT NOTE - HPI (INCLUDE ILLNESS QUALITY, SEVERITY, DURATION, TIMING, CONTEXT, MODIFYING FACTORS, ASSOCIATED SIGNS AND SYMPTOMS)
48 yo M with a hx of HTN on amlodipine, mood disorder and depression on divaproex ER 1000mg at night and seroquel BIB ambulance from [facility] for headache, dizziness, and feelings of depression.  Pt. states he was traveling from a friends house in [place] to [place] to his moms house and began feeling upset and depressed about not being able to get his meds filled.  Pt. states his doctor in [place] on [facility] (Dr. Higgins?) will be out of the office due to vacation and he ran out of his norvasc and is low on his depakote.  Pt. complains of feeling depressed because of this and had fleeting thoughts of hurting himself but decided not to.  Pt. denies any numbness, speech problems, motor weakness, trouble walking.  He denies head injury or neck pain.  He did not take anything for headache prior to arrival.  Pt. denies alcohol or any drug use 46 yo M with a hx of HTN on amlodipine, mood disorder and depression on divaproex ER 1000mg at night and seroquel BIB ambulance from [facility] for headache, dizziness, and feelings of depression.  Pt. states he was traveling from a friends house in [place] to [place] to his moms house and began feeling upset and depressed about not being able to get his meds filled.  Pt. states his doctor in [place] on [address] (Dr. Higgins?) will be out of the office due to vacation and he ran out of his norvasc and is low on his depakote.  Pt. complains of feeling depressed because of this and had fleeting thoughts of hurting himself but decided not to.  Pt. denies any numbness, speech problems, motor weakness, trouble walking.  He denies head injury or neck pain.  He did not take anything for headache prior to arrival.  Pt. denies alcohol or any drug use    Patient denies current symptoms except for headaches which may have been due to elevated BP but he was told possibly Depakote as well.  he reports he has bipolar depression    Patient is applying to be treated at [facility] in [place] close to where mother lives.  He lives in [place].

## 2018-04-13 NOTE — ED BEHAVIORAL HEALTH ASSESSMENT NOTE - SUMMARY
46 yo man with history of depression no-compliant with meds came to ER for headache and was refrred for consult for depression.  As per pharmacy he has not picked up prescribed meds and is currently not seeing a psychiatrist regularly

## 2018-04-13 NOTE — ED PROVIDER NOTE - OBJECTIVE STATEMENT
Pt. is a 48 yo M with a hx of HTN on amlodipine, mood disorder and depression on divaproex ER 1000mg at night and seroquel BIB ambulance from [facility] for headache, dizziness, and feelings of depression.  Pt. states he was traveling from a friends house in [place] to [place] to his moms house and began feeling upset and depressed about not being able to get his meds filled.  Pt. states his doctor in [place] on [address] (Dr. Higgins?) will be out of the office due to vacation and he ran out of his norvasc and is low on his depakote.  Pt. complains of feeling depressed because of this and had fleeting thoughts of hurting himself but decided not to.  Pt. denies any numbness, speech problems, motor weakness, trouble walking.  He denies head injury or neck pain.  He did not take anything for headache prior to arrival.  Pt. denies alcohol or any drug use.

## 2018-04-13 NOTE — ED PROVIDER NOTE - CARE PLAN
Principal Discharge DX:	Depression, unspecified depression type  Secondary Diagnosis:	Noncompliance  Secondary Diagnosis:	Hypertension, unspecified type

## 2018-04-13 NOTE — ED ADULT TRIAGE NOTE - CHIEF COMPLAINT QUOTE
c/o headache.  pt states he has been off his BP and depression medication.  pt denies and SI or HI. pt calm and cooperative, pt called 911 because of headache

## 2018-04-13 NOTE — ED BEHAVIORAL HEALTH ASSESSMENT NOTE - DESCRIPTION
quite on 1:1 quiet on 1:1 HTN Used to work in  [employer]- on disability as he slipped and fell and ritter a disc injury

## 2018-04-13 NOTE — ED BEHAVIORAL HEALTH ASSESSMENT NOTE - PAST PSYCHOTROPIC MEDICATION
Depakote 500 mg Am 1000 mg hs  Trazodone 100 mg hs    Pharmacy reports scripts on file he has not picked up  Vistaril 50 mg TID prn Seroquel 200 mg hs Prozac 40 mg daily

## 2018-04-13 NOTE — ED ADULT TRIAGE NOTE - CHIEF COMPLAINT QUOTE
I have had a headache for the past 3 days, went to another ER yesterday, they did nothing for me but give me ibuprofen that didn't work.

## 2018-04-13 NOTE — ED PROVIDER NOTE - PROGRESS NOTE DETAILS
Attending MD Greene:  Sign out from Dr. Florez, pt awaiting psych.  Pt cleared by [facility] and safe for discharge and f/u.

## 2018-10-02 ENCOUNTER — EMERGENCY (EMERGENCY)
Facility: HOSPITAL | Age: 48
LOS: 1 days | Discharge: HOME | End: 2018-10-02
Attending: STUDENT IN AN ORGANIZED HEALTH CARE EDUCATION/TRAINING PROGRAM
Payer: MEDICARE

## 2018-10-02 VITALS
RESPIRATION RATE: 18 BRPM | TEMPERATURE: 98 F | SYSTOLIC BLOOD PRESSURE: 138 MMHG | HEART RATE: 80 BPM | DIASTOLIC BLOOD PRESSURE: 85 MMHG | OXYGEN SATURATION: 97 %

## 2018-10-02 PROCEDURE — 99284 EMERGENCY DEPT VISIT MOD MDM: CPT

## 2018-10-02 PROCEDURE — 71046 X-RAY EXAM CHEST 2 VIEWS: CPT | Mod: 26

## 2018-10-02 NOTE — ED PROVIDER NOTE - OBJECTIVE STATEMENT
47 y/o M, with hx of bronchitis, anxiety, depression, HLD, and HTN, presents to the ED c/o constant cough, onset 4 days ago.  Pt states that he is producing mucous when coughing.  Notes self medicating with several OTC cough medications, with no relief.  Associated sx include HA after coughing and mild sore throat.  Denies fever, chills, chest pain, abd pain, N/V/D, back pain, or visual changes.

## 2018-10-02 NOTE — ED PROVIDER NOTE - PMH
Anxiety    Depression    Depression    High cholesterol    Hypertension    Hypertension, unspecified type    Mood disorder

## 2018-10-03 PROBLEM — F32.9 MAJOR DEPRESSIVE DISORDER, SINGLE EPISODE, UNSPECIFIED: Chronic | Status: ACTIVE | Noted: 2018-01-27

## 2018-10-03 PROBLEM — E78.00 PURE HYPERCHOLESTEROLEMIA, UNSPECIFIED: Chronic | Status: ACTIVE | Noted: 2018-02-26

## 2018-10-03 PROBLEM — F41.9 ANXIETY DISORDER, UNSPECIFIED: Chronic | Status: ACTIVE | Noted: 2018-02-26

## 2018-10-03 PROBLEM — F39 UNSPECIFIED MOOD [AFFECTIVE] DISORDER: Chronic | Status: ACTIVE | Noted: 2018-04-13

## 2018-10-03 PROBLEM — I10 ESSENTIAL (PRIMARY) HYPERTENSION: Chronic | Status: ACTIVE | Noted: 2018-04-13

## 2018-10-03 PROBLEM — I10 ESSENTIAL (PRIMARY) HYPERTENSION: Chronic | Status: ACTIVE | Noted: 2018-02-26

## 2018-10-03 PROBLEM — F32.9 MAJOR DEPRESSIVE DISORDER, SINGLE EPISODE, UNSPECIFIED: Chronic | Status: ACTIVE | Noted: 2018-02-26

## 2018-10-03 PROCEDURE — 99283 EMERGENCY DEPT VISIT LOW MDM: CPT

## 2018-10-03 PROCEDURE — 71046 X-RAY EXAM CHEST 2 VIEWS: CPT

## 2018-10-03 RX ADMIN — Medication 100 MILLIGRAM(S): at 00:17

## 2019-04-09 PROBLEM — Z00.00 ENCOUNTER FOR PREVENTIVE HEALTH EXAMINATION: Status: ACTIVE | Noted: 2019-04-09

## 2019-05-07 ENCOUNTER — APPOINTMENT (OUTPATIENT)
Dept: GASTROENTEROLOGY | Facility: CLINIC | Age: 49
End: 2019-05-07

## 2019-05-26 ENCOUNTER — EMERGENCY (EMERGENCY)
Facility: HOSPITAL | Age: 49
LOS: 1 days | Discharge: HOME | End: 2019-05-26
Attending: EMERGENCY MEDICINE
Payer: MEDICARE

## 2019-05-26 VITALS
HEIGHT: 70 IN | OXYGEN SATURATION: 96 % | DIASTOLIC BLOOD PRESSURE: 84 MMHG | SYSTOLIC BLOOD PRESSURE: 131 MMHG | HEART RATE: 97 BPM | TEMPERATURE: 99 F | WEIGHT: 218.04 LBS | RESPIRATION RATE: 18 BRPM

## 2019-05-26 PROCEDURE — 99283 EMERGENCY DEPT VISIT LOW MDM: CPT

## 2019-05-26 PROCEDURE — 94640 AIRWAY INHALATION TREATMENT: CPT

## 2019-05-26 PROCEDURE — 99284 EMERGENCY DEPT VISIT MOD MDM: CPT | Mod: 25

## 2019-05-26 RX ORDER — ALBUTEROL 90 UG/1
2 AEROSOL, METERED ORAL
Qty: 1 | Refills: 0
Start: 2019-05-26 | End: 2019-06-01

## 2019-05-26 RX ORDER — ALBUTEROL 90 UG/1
2 AEROSOL, METERED ORAL ONCE
Refills: 0 | Status: COMPLETED | OUTPATIENT
Start: 2019-05-26 | End: 2019-05-26

## 2019-05-26 RX ADMIN — Medication 100 MILLIGRAM(S): at 21:34

## 2019-05-26 RX ADMIN — ALBUTEROL 2 PUFF(S): 90 AEROSOL, METERED ORAL at 21:34

## 2019-05-26 RX ADMIN — Medication 40 MILLIGRAM(S): at 22:37

## 2019-05-26 NOTE — ED PROVIDER NOTE - OBJECTIVE STATEMENT
Pt is a 48 y/o M, daily smoker, no other PMH, presents to ED c/o cough x 5 days. Pt is a 48 y/o M, daily smoker, no other PMH, presents to ED c/o cough x 5 days. Pt states the cough as been production of "clear liquid" and has progressively worsened over the last 5 days. Pt states he has been diagnosed with bronchitis multiple times in the past and his symptoms feel similar to his previous presentation. Pt states he was previously prescribed an inhaler for his symptoms and has had minimal relief of symptoms. Pt has had no fevers since onset of symptoms. Pt has no sick contacts. Pt is a daily smoker. Pt denies fever, chills, fevers, belly pain, CP, SOB, palpitations. Pt is a 50 y/o M, daily smoker, bronchitis, no other PMH, presents to ED c/o cough x 5 days. Pt states the cough as been production of "clear liquid" and has progressively worsened over the last 5 days. Pt states he has been diagnosed with bronchitis multiple times in the past and his symptoms feel similar to his previous presentation. Pt states he was previously prescribed an inhaler for his symptoms and has had minimal relief of symptoms. Pt has had no fevers since onset of symptoms. Pt has no sick contacts. Pt is a daily smoker. Pt denies fever, chills, fevers, belly pain, CP, SOB, palpitations.

## 2019-05-26 NOTE — ED PROVIDER NOTE - CLINICAL SUMMARY MEDICAL DECISION MAKING FREE TEXT BOX
49m with + cough x 5 days. No fevers / CP / palpitations. Pt is a daily smoker and has history of recurrent bronchitis. Will treat with inhaler / PO pred / antitussive and reassess.

## 2019-05-26 NOTE — ED PROVIDER NOTE - ATTENDING CONTRIBUTION TO CARE
I, Jessa Rodriguez, performed a face to face bedside interview with this patient regarding history of present illness, review of symptoms and relevant past medical, social and family history.  I completed an independent physical examination. Medical decision making, follow-up on ordered tests (ie labs, radiologic studies) and re-evaluation of the patient's status has been communicated to the ACP.  Disposition of the patient will be based on test outcome and response to ED interventions.     50yo M with h/o recurrent bronchitis due to smoking no COPD/asthma. 5 days of cough, quit smoking 3 days ago. white phlegm. no fever. PMD out of town. was on inhaler in past. no recent steroids recently.     NAD, breathing comfortably, clear BS b/l   RRR   normal perfusions     patient with cough, no fever improved with albuterol. will d/c with cough/prednisone

## 2019-05-26 NOTE — ED PROVIDER NOTE - PROGRESS NOTE DETAILS
PA note: Improvement in lung sounds b/l s/p treatment. Will treat with course of PO prednisone, albuterol inhaler, and Tessalon for likely reactive airway.

## 2019-07-07 ENCOUNTER — EMERGENCY (EMERGENCY)
Facility: HOSPITAL | Age: 49
LOS: 1 days | Discharge: HOME | End: 2019-07-07
Attending: EMERGENCY MEDICINE
Payer: MEDICARE

## 2019-07-07 VITALS
HEART RATE: 71 BPM | OXYGEN SATURATION: 98 % | RESPIRATION RATE: 18 BRPM | DIASTOLIC BLOOD PRESSURE: 99 MMHG | SYSTOLIC BLOOD PRESSURE: 178 MMHG

## 2019-07-07 VITALS
SYSTOLIC BLOOD PRESSURE: 170 MMHG | WEIGHT: 214.95 LBS | OXYGEN SATURATION: 97 % | DIASTOLIC BLOOD PRESSURE: 104 MMHG | TEMPERATURE: 98 F | RESPIRATION RATE: 16 BRPM | HEIGHT: 70 IN | HEART RATE: 81 BPM

## 2019-07-07 LAB
ALBUMIN SERPL ELPH-MCNC: 4.6 G/DL — SIGNIFICANT CHANGE UP (ref 3.3–5.2)
ALP SERPL-CCNC: 79 U/L — SIGNIFICANT CHANGE UP (ref 40–120)
ALT FLD-CCNC: 53 U/L — HIGH
ANION GAP SERPL CALC-SCNC: 11 MMOL/L — SIGNIFICANT CHANGE UP (ref 5–17)
AST SERPL-CCNC: 24 U/L — SIGNIFICANT CHANGE UP
BASOPHILS # BLD AUTO: 0.04 K/UL — SIGNIFICANT CHANGE UP (ref 0–0.2)
BASOPHILS NFR BLD AUTO: 0.5 % — SIGNIFICANT CHANGE UP (ref 0–2)
BILIRUB SERPL-MCNC: 0.4 MG/DL — SIGNIFICANT CHANGE UP (ref 0.4–2)
BUN SERPL-MCNC: 15 MG/DL — SIGNIFICANT CHANGE UP (ref 8–20)
CALCIUM SERPL-MCNC: 9.4 MG/DL — SIGNIFICANT CHANGE UP (ref 8.6–10.2)
CHLORIDE SERPL-SCNC: 109 MMOL/L — HIGH (ref 98–107)
CO2 SERPL-SCNC: 24 MMOL/L — SIGNIFICANT CHANGE UP (ref 22–29)
CREAT SERPL-MCNC: 0.97 MG/DL — SIGNIFICANT CHANGE UP (ref 0.5–1.3)
EOSINOPHIL # BLD AUTO: 0.27 K/UL — SIGNIFICANT CHANGE UP (ref 0–0.5)
EOSINOPHIL NFR BLD AUTO: 3.4 % — SIGNIFICANT CHANGE UP (ref 0–6)
GLUCOSE SERPL-MCNC: 103 MG/DL — SIGNIFICANT CHANGE UP (ref 70–115)
HCT VFR BLD CALC: 41.4 % — SIGNIFICANT CHANGE UP (ref 39–50)
HGB BLD-MCNC: 14.1 G/DL — SIGNIFICANT CHANGE UP (ref 13–17)
IMM GRANULOCYTES NFR BLD AUTO: 0.8 % — SIGNIFICANT CHANGE UP (ref 0–1.5)
LYMPHOCYTES # BLD AUTO: 1.84 K/UL — SIGNIFICANT CHANGE UP (ref 1–3.3)
LYMPHOCYTES # BLD AUTO: 23.4 % — SIGNIFICANT CHANGE UP (ref 13–44)
MCHC RBC-ENTMCNC: 30.8 PG — SIGNIFICANT CHANGE UP (ref 27–34)
MCHC RBC-ENTMCNC: 34.1 GM/DL — SIGNIFICANT CHANGE UP (ref 32–36)
MCV RBC AUTO: 90.4 FL — SIGNIFICANT CHANGE UP (ref 80–100)
MONOCYTES # BLD AUTO: 0.67 K/UL — SIGNIFICANT CHANGE UP (ref 0–0.9)
MONOCYTES NFR BLD AUTO: 8.5 % — SIGNIFICANT CHANGE UP (ref 2–14)
NEUTROPHILS # BLD AUTO: 4.97 K/UL — SIGNIFICANT CHANGE UP (ref 1.8–7.4)
NEUTROPHILS NFR BLD AUTO: 63.4 % — SIGNIFICANT CHANGE UP (ref 43–77)
NT-PROBNP SERPL-SCNC: 35 PG/ML — SIGNIFICANT CHANGE UP (ref 0–300)
PLATELET # BLD AUTO: 218 K/UL — SIGNIFICANT CHANGE UP (ref 150–400)
POTASSIUM SERPL-MCNC: 4.3 MMOL/L — SIGNIFICANT CHANGE UP (ref 3.5–5.3)
POTASSIUM SERPL-SCNC: 4.3 MMOL/L — SIGNIFICANT CHANGE UP (ref 3.5–5.3)
PROT SERPL-MCNC: 6.5 G/DL — LOW (ref 6.6–8.7)
RBC # BLD: 4.58 M/UL — SIGNIFICANT CHANGE UP (ref 4.2–5.8)
RBC # FLD: 12 % — SIGNIFICANT CHANGE UP (ref 10.3–14.5)
SODIUM SERPL-SCNC: 144 MMOL/L — SIGNIFICANT CHANGE UP (ref 135–145)
TROPONIN T SERPL-MCNC: <0.01 NG/ML — SIGNIFICANT CHANGE UP (ref 0–0.06)
WBC # BLD: 7.85 K/UL — SIGNIFICANT CHANGE UP (ref 3.8–10.5)
WBC # FLD AUTO: 7.85 K/UL — SIGNIFICANT CHANGE UP (ref 3.8–10.5)

## 2019-07-07 PROCEDURE — 93005 ELECTROCARDIOGRAM TRACING: CPT

## 2019-07-07 PROCEDURE — 80053 COMPREHEN METABOLIC PANEL: CPT

## 2019-07-07 PROCEDURE — 83880 ASSAY OF NATRIURETIC PEPTIDE: CPT

## 2019-07-07 PROCEDURE — 36415 COLL VENOUS BLD VENIPUNCTURE: CPT

## 2019-07-07 PROCEDURE — 70450 CT HEAD/BRAIN W/O DYE: CPT | Mod: 26

## 2019-07-07 PROCEDURE — 70450 CT HEAD/BRAIN W/O DYE: CPT

## 2019-07-07 PROCEDURE — 84484 ASSAY OF TROPONIN QUANT: CPT

## 2019-07-07 PROCEDURE — 99284 EMERGENCY DEPT VISIT MOD MDM: CPT | Mod: 25

## 2019-07-07 PROCEDURE — 99284 EMERGENCY DEPT VISIT MOD MDM: CPT

## 2019-07-07 PROCEDURE — 93010 ELECTROCARDIOGRAM REPORT: CPT

## 2019-07-07 PROCEDURE — 85027 COMPLETE CBC AUTOMATED: CPT

## 2019-07-07 RX ORDER — INSULIN HUMAN 100 [IU]/ML
4 INJECTION, SOLUTION SUBCUTANEOUS ONCE
Refills: 0 | Status: DISCONTINUED | OUTPATIENT
Start: 2019-07-07 | End: 2019-07-07

## 2019-07-07 RX ORDER — LABETALOL HCL 100 MG
10 TABLET ORAL ONCE
Refills: 0 | Status: DISCONTINUED | OUTPATIENT
Start: 2019-07-07 | End: 2019-07-07

## 2019-07-07 RX ORDER — HYDROCHLOROTHIAZIDE 25 MG
25 TABLET ORAL ONCE
Refills: 0 | Status: COMPLETED | OUTPATIENT
Start: 2019-07-07 | End: 2019-07-07

## 2019-07-07 RX ADMIN — Medication 25 MILLIGRAM(S): at 16:53

## 2019-07-07 NOTE — ED ADULT NURSE REASSESSMENT NOTE - NS ED NURSE REASSESS COMMENT FT1
Pt assessed, treated and d/c prior to RN assessment by Dr Rodriguez, pt hypertensive upon d/c, gave BP medication prior to leaving, prescription sent to pharmacy, AOx4, ambulated safely and steadily w/out assistance to ED exit, to follow up w/ PMD, IV access removed.

## 2019-07-07 NOTE — ED STATDOCS - PROGRESS NOTE DETAILS
48 y/o M pt with PMHx of HTN and HLD (not on medication) presents to ED c/o headache and dizziness that onset 4 days ago. Pt reports these sx occur when his blood pressure is elevated. Pt takes Norvasc 5mg. Pt does not have a blood pressure machine at home. Notes after eating salty foods his sx worsen. Last episode of elevated BP several years ago. He does not see a cardiologist. Denies blurred vision, SOB, CP, nausea, vomiting. No further acute complaints at this time.  Focused eval, protocol orders entered. Pt to be moved to main ED for complete evaluation by another provider.

## 2019-07-07 NOTE — ED PROVIDER NOTE - PROGRESS NOTE DETAILS
pt feels better, started on hctz as discussed earlier and discharged home with bp mamangement instructions

## 2019-07-07 NOTE — ED PROVIDER NOTE - CLINICAL SUMMARY MEDICAL DECISION MAKING FREE TEXT BOX
plan to do ct head, wally htn urgency, will check labs if no sign of end organ danage, will add hctz to regimen and advsied about bp diary and then f/u with pmd in 1 wk

## 2019-07-07 NOTE — ED PROVIDER NOTE - OBJECTIVE STATEMENT
50 y/o M with h/o htn, depression on lexapro, lamictal p/w 50 y/o M with h/o htn, depression on lexapro, lamictal p/w headache with intermittent dizziness-lightheadedness with rise in bp but no nausea, vomiting, photophobia, neck stiffness, focal weakness, numbness or fall. No fever, chills, diarrhea. Pt has been complaint with norvasc

## 2019-07-07 NOTE — ED ADULT NURSE REASSESSMENT NOTE - NS ED NURSE REASSESS COMMENT FT1
Assuming care @ 1547, pt resting comfortably in stretcher, BP lower than initially in triage, awaiting lab results, pt aware of plan of care, showing NSR on monitor.

## 2019-07-07 NOTE — ED ADULT NURSE NOTE - CHIEF COMPLAINT QUOTE
"I feel like I have HBP I am taking Norvasc 5mg and I think I need a higher dose. " Pt states he has a headache and feels dizzy.

## 2019-07-07 NOTE — ED ADULT TRIAGE NOTE - NS ED NURSE DIRECT TO ROOM YN
Pt's spouse stated he had a sz yesterday and jan 2nd had a bad sz and went to [facility] ED not meds were changed or added. Pt has been a little stressed with work, spouse said he ate in the morning but had an empty stomach all day yesterday. She is not sure what could have caused it and is concerned. Pt is on keppra 500 mg 2 times a day.   Yes

## 2020-08-27 ENCOUNTER — APPOINTMENT (OUTPATIENT)
Dept: HEPATOLOGY | Facility: CLINIC | Age: 50
End: 2020-08-27

## 2020-11-12 ENCOUNTER — TRANSCRIPTION ENCOUNTER (OUTPATIENT)
Age: 50
End: 2020-11-12

## 2021-01-23 NOTE — ED BEHAVIORAL HEALTH ASSESSMENT NOTE - NS ED BHA MED ROS HEMATOLOGIC LYMPHATIC
Pt assessed. Pt is awake and alert. Robaxin given for discomfort. 2 units PRBC completed. Fall precautions maintained. Pt admitted; awaiting bed. Pt is not in any distress. Safety and comfort maintained. No complaints

## 2021-01-27 NOTE — ED BEHAVIORAL HEALTH ASSESSMENT NOTE - DOMICILED WITH
I personally performed the service described in the documentation recorded by the scribe in my presence, and it accurately and completely records my words and actions. Alone

## 2021-04-27 NOTE — ED BEHAVIORAL HEALTH ASSESSMENT NOTE - NICOTINE
Discharge instructions and medications reviewed, no concerns. Valentín valenciaay to d/c home.      Quang Mensah, RN  04/26/21 2720
None known

## 2021-05-06 NOTE — ED PROVIDER NOTE - TEMPLATE, MLM
Psych/Behavioral Tremfya Counseling: I discussed with the patient the risks of guselkumab including but not limited to immunosuppression, serious infections, worsening of inflammatory bowel disease and drug reactions.  The patient understands that monitoring is required including a PPD at baseline and must alert us or the primary physician if symptoms of infection or other concerning signs are noted.

## 2021-06-25 NOTE — ED BEHAVIORAL HEALTH ASSESSMENT NOTE - COLLATERAL SOURCE
None available Metronidazole Counseling:  I discussed with the patient the risks of metronidazole including but not limited to seizures, nausea/vomiting, a metallic taste in the mouth, nausea/vomiting and severe allergy.

## 2022-01-04 NOTE — ED PROVIDER NOTE - CARDIAC, MLM
Please call Central Scheduling : [phone] to schedule Ct chest lung cancer screening    Labs today    To see Dr Goddard in 6 months no lab.    Medicare Wellness Visit  Plan for Preventive Care    A good way for you to stay healthy is to use preventive care.  Medicare covers many services that can help you stay healthy.* The goal of these services is to find any health problems as quickly as possible. Finding problems early can help make them easier to treat.  Your personal plan below lists the services you may need and when they are due.     Health Maintenance Summary     COVID-19 Vaccine (1)  Overdue - never done    Shingles Vaccine (1 of 2)  Overdue - never done    Hepatitis C Screening (Once)  Overdue - never done    Colorectal Cancer Screen- (Colonoscopy - Every 10 Years)  Overdue since 2/13/2015    Pneumococcal Vaccine 65+ (1 of 1 - PPSV23)  Overdue - never done    Breast Cancer Screening (Every 2 Years)  Overdue since 5/29/2021    Influenza Vaccine (1)  Overdue - never done    Medicare Wellness Visit (Yearly)  Overdue since 12/22/2021    Lung Cancer Screening (Yearly)  Ordered on 1/4/2022    Depression Screening (Yearly)  Next due on 1/4/2023    DTaP/Tdap/Td Vaccine (2 - Td or Tdap)  Next due on 10/18/2023    Osteoporosis Screening   Completed    Hepatitis B Vaccine   Aged Out    Meningococcal Vaccine   Aged Out    HPV Vaccine   Aged Out           Preventive Care for Women and Men    Heart Screenings (Cardiovascular):  · Blood tests are used to check your cholesterol, lipid and triglyceride levels. High levels can increase your risk for heart disease and stroke. High levels can be treated with medications, diet and exercise. Lowering your levels can help keep your heart and blood vessels healthy.  Your provider will order these tests if they are needed.    · An ultrasound is done to see if you have an abdominal aortic aneurysm (AAA).  This is an enlargement of one of the main blood vessels that delivers  blood to the body.   In the [place], 9,000 deaths are caused by AAA.  You may not even know you have this problem and as many as 1 in 3 people will have a serious problem if it is not treated.  Early diagnosis allows for more effective treatment and cure.  If you have a family history of AAA or are a male age 65-75 who has smoked, you are at higher risk of an AAA.  Your provider can order this test, if needed.    Colorectal Screening:  · There are many tests that are used to check for cancer of your colon and rectum. You and your provider should discuss what test is best for you and when to have it done.  Options include:  · Screening Colonoscopy: exam of the entire colon, seen through a flexible lighted tube.  · Flexible Sigmoidoscopy: exam of the last third (sigmoid portion) of the colon and rectum, seen through a flexible lighted tube.  · Cologuard DNA stool test: a sample of your stool is used to screen for cancer and unseen blood in your stool.  · Fecal Occult Blood Test: a sample of your stool is studied to find any unseen blood    Flu Shot:  · An immunization that helps to prevent influenza (the flu). You should get this every year. The best time to get the shot is in the fall.    Pneumococcal Shot:  • Vaccines are available that can help prevent pneumococcal disease, which is any type of infection caused by Streptococcus pneumoniae bacteria.   Their use can prevent some cases of pneumonia, meningitis, and sepsis. There are two types of pneumococcal vaccines:   o Conjugate vaccines (PCV-13 or Prevnar 13®) - helps protect against the 13 types of pneumococcal bacteria that are the most common causes of serious infections in children and adults.    o Polysaccharide vaccine (PPSV23 or Aqfeznbsy13®) - helps protect against 23 types of pneumococcal bacteria for patients who are recommended to get it.  These vaccines should be given at least 12 months apart.  A booster is usually not needed.     Hepatitis B  Shot:  · An immunization that helps to protect people from getting Hepatitis B. Hepatitis B is a virus that spreads through contact with infected blood or body fluids. Many people with the virus do not have symptoms.  The virus can lead to serious problems, such as liver disease. Some people are at higher risk than others. Your doctor will tell you if you need this shot.     Diabetes Screening:  · A test to measure sugar (glucose) in your blood is called a fasting blood sugar. Fasting means you cannot have food or drink for at least 8 hours before the test. This test can detect diabetes long before you may notice symptoms.    Glaucoma Screening:  · Glaucoma screening is performed by your eye doctor. The test measures the fluid pressure inside your eyes to determine if you have glaucoma.     Hepatitis C Screening:  · A blood test to see if you have the hepatitis C virus.  Hepatitis C attacks the liver and is a major cause of chronic liver disease.  Medicare will cover a single screening for all adults born between 1945 & 1965, or high risk patients (people who have injected illegal drugs or people who have had blood transfusions).  High risk patients who continue to inject illegal drugs can be screened for Hepatitis C every year.    Smoking and Tobacco-Use Cessation Counseling:  · Tobacco is the single greatest cause of disease and early death in our country today. Medication and counseling together can increase a person’s chance of quitting for good.   · Medicare covers two quitting attempts per year, with four counseling sessions per attempt (eight sessions in a 12 month period)    Preventive Screening tests for Women    Screening Mammograms and Breast Exams:  · An x-ray of your breasts to check for breast cancer before you or your doctor may be able to feel it.  If breast cancer is found early it can usually be treated with success.    Pelvic Exams and Pap Tests:  · An exam to check for cervical and vaginal  cancer. A Pap test is a lab test in which cells are taken from your cervix and sent to the lab to look for signs of cervical cancer. If cancer of the cervix is found early, chances for a cure are good. Testing can generally end at age 65, or if a woman has a hysterectomy for a benign condition. Your provider may recommend more frequent testing if certain abnormal results are found.    Bone Mass Measurements:  · A painless x-ray of your bone density to see if you are at risk for a broken bone. Bone density refers to the thickness of bones or how tightly the bone tissue is packed.    Preventive Screening tests for Men    Prostate Screening:  · Should you have a prostate cancer test (PSA)?  It is up to you to decide if you want a prostate cancer test. Talk to your clinician to find out if the test is right for you.  Things for you to consider and talk about should include:  · Benefits and harms of the test  · Your family history  · How your race/ethnicity may influence the test  · If the test may impact other medical conditions you have  · Your values on screenings and treatments    *Medicare pays for many preventive services to keep you healthy. For some of these services, you might have to pay a deductible, coinsurance, and / or copayment.  The amounts vary depending on the type of services you need and the kind of Medicare health plan you have.    For further details on screenings offered by Medicare please visit: https://www.medicare.gov/coverage/preventive-screening-services    Normal rate, regular rhythm.  Heart sounds S1, S2.  No murmurs

## 2022-02-28 ENCOUNTER — EMERGENCY (EMERGENCY)
Facility: HOSPITAL | Age: 52
LOS: 1 days | Discharge: HOME | End: 2022-02-28
Attending: EMERGENCY MEDICINE
Payer: MEDICARE

## 2022-02-28 VITALS
TEMPERATURE: 100 F | SYSTOLIC BLOOD PRESSURE: 152 MMHG | WEIGHT: 212.08 LBS | RESPIRATION RATE: 18 BRPM | HEIGHT: 70 IN | HEART RATE: 90 BPM | OXYGEN SATURATION: 94 % | DIASTOLIC BLOOD PRESSURE: 95 MMHG

## 2022-02-28 PROCEDURE — 99283 EMERGENCY DEPT VISIT LOW MDM: CPT | Mod: 25

## 2022-02-28 PROCEDURE — 73562 X-RAY EXAM OF KNEE 3: CPT | Mod: 26,RT

## 2022-02-28 PROCEDURE — 99283 EMERGENCY DEPT VISIT LOW MDM: CPT | Mod: GC

## 2022-02-28 PROCEDURE — 73562 X-RAY EXAM OF KNEE 3: CPT

## 2022-02-28 RX ORDER — IBUPROFEN 200 MG
600 TABLET ORAL ONCE
Refills: 0 | Status: COMPLETED | OUTPATIENT
Start: 2022-02-28 | End: 2022-02-28

## 2022-02-28 RX ORDER — ACETAMINOPHEN 500 MG
650 TABLET ORAL ONCE
Refills: 0 | Status: COMPLETED | OUTPATIENT
Start: 2022-02-28 | End: 2022-02-28

## 2022-02-28 RX ADMIN — Medication 600 MILLIGRAM(S): at 22:24

## 2022-02-28 RX ADMIN — Medication 650 MILLIGRAM(S): at 22:24

## 2022-02-28 NOTE — ED PROVIDER NOTE - PROGRESS NOTE DETAILS
Reviewed negative w/u results with pt. Pt reports feeling much improved. Comfortable with plan for d/c. Pt agrees to f/u with ortho. Return instructions given, questions answered. - Boaz Pacheco, PGY-3

## 2022-02-28 NOTE — ED PROVIDER NOTE - CARE PROVIDER_API CALL
Efren Vázquez)  Orthopedics  [address] [place] [id number]  Phone: [phone]  Fax: [phone]  Follow Up Time:

## 2022-02-28 NOTE — ED PROVIDER NOTE - NSICDXPASTMEDICALHX_GEN_ALL_CORE_FT
PAST MEDICAL HISTORY:  Anxiety     Depression     Depression     High cholesterol     Hypertension     Hypertension, unspecified type     Mood disorder

## 2022-02-28 NOTE — ED PROVIDER NOTE - PATIENT PORTAL LINK FT
You can access the FollowMyHealth Patient Portal offered by [facility] by registering at the following website: [email or web address]. By joining [facility]’s FollowMyHealth portal, you will also be able to view your health information using other applications (apps) compatible with our system.

## 2022-02-28 NOTE — ED PROVIDER NOTE - ATTENDING CONTRIBUTION TO CARE
I, Jose Aguilar, personally saw the patient with the resident, and completed the key components of the history and physical exam. I then discussed the management plan with the resident.    50 yo M hx of HTn, depression, and anxiety p/w right knee pain x 2 days after waking up. patient report intermittent knee pain. no trauma. full ROM. xray negative. tylenol and motrin given. Outpatient follow up recommended.

## 2022-02-28 NOTE — ED PROVIDER NOTE - ADDITIONAL NOTES AND INSTRUCTIONS:
Molina was evaluated in the emergency department today. They are to be excused from work/school and can return on the above date. Thank you.

## 2022-02-28 NOTE — ED PROVIDER NOTE - PHYSICAL EXAMINATION
Gen: no acute distress, ambulatory with steady gait  Head: normocephalic, atraumatic  Lung: CTAB, no respiratory distress, no wheezing, rales, rhonchi  CV: normal s1/s2, rrr,   Abd: soft, no tenderness, non-distended  MSK: No edema, no visible deformities, full range of motion in all 4 extremities, tenderness to R knee  Neuro: No focal neurologic deficits  Skin: No rash   Psych: normal affect

## 2022-02-28 NOTE — ED ADULT TRIAGE NOTE - CHIEF COMPLAINT QUOTE
patient ambulated into ER, denies injury states went to bed Friday night woke up Saturday with right knee pain, difficulty bending

## 2022-03-03 NOTE — ED ADULT NURSE NOTE - SPO2 (%)
[FreeTextEntry1] : Ms. SHARLA RAHMAN is a 22 year  yo female who presents with bilateral macromastia complaining of bilateral shoulder, neck, and back pain for many years.  She states that she has tried an array of supportive bras, including expensive specialty bras, and none of these have helped her.  She has tried Tylenol and ibuprofen which have not provided any relief.  She complains of repetitive bouts of rashes under her breasts and between her breasts which she treats with a variety of topical medications but the rashes keep coming back.  She complains that her large breasts cause her constant daily pain and affect her activities of daily life including making running, exercising, and many other activities very painful.  She wears a G bra.  She has no family history of early breast cancer.  She is a non-smoker.  She has no previous history of any breast problems or breast surgeries.  She has no history of traumatic injuries or back surgery that could explain her complaints.  \par \par Pt was seen in 2/2019 and again 3/2021 with the same complaints, the pain in her neck and shoulders has not improved and continues to worsen\par \par non smoker, no anticoagulation \par Hx UC\par no bleeding disorders\par \par  97

## 2022-03-31 ENCOUNTER — APPOINTMENT (OUTPATIENT)
Dept: ORTHOPEDIC SURGERY | Facility: CLINIC | Age: 52
End: 2022-03-31

## 2022-05-16 ENCOUNTER — APPOINTMENT (OUTPATIENT)
Dept: ORTHOPEDIC SURGERY | Facility: CLINIC | Age: 52
End: 2022-05-16

## 2022-05-16 DIAGNOSIS — M25.569 PAIN IN UNSPECIFIED KNEE: ICD-10-CM

## 2022-06-06 ENCOUNTER — APPOINTMENT (OUTPATIENT)
Dept: ORTHOPEDIC SURGERY | Facility: CLINIC | Age: 52
End: 2022-06-06

## 2022-06-21 NOTE — ED PROVIDER NOTE - CROS ED CONS ALL NEG
Initiate Treatment: Apply efudex twice daily for 2-3 weeks  on the face.  Risks reviewed in detail Detail Level: Simple - - -

## 2023-05-04 NOTE — ED BEHAVIORAL HEALTH ASSESSMENT NOTE - LEGAL HISTORY
Calcipotriene Counseling:  I discussed with the patient the risks of calcipotriene including but not limited to erythema, scaling, itching, and irritation. n/a

## 2023-10-13 NOTE — ED ADULT NURSE NOTE - AS SC BRADEN ACTIVITY
Spoke to DR Mccain's office for a new order for pt's Prolia.  Office is going to send over new order to fax # [phone]    Electronically signed by Enoch Mcmullen on 10/13/2023 at 9:31 AM
(4) walks frequently

## 2023-10-31 NOTE — ED BEHAVIORAL HEALTH ASSESSMENT NOTE - REFERRAL / APPOINTMENT DETAILS
DISCHARGE SUMMARY  Patient ID:   Ericka Marino   [id number]   42 year old   1981   Admit date: 10/30/2023   Discharge date: 10/31/2023   Admitting Physician: Alexys Saleh MD   Discharge Physician: Emmy Aquino DO     Primary Diagnoses:   Principal Problem:    TIA (transient ischemic attack)  Active Problems:    Essential hypertension    Complex regional pain syndrome of right lower extremity    Elza-Danlos syndrome, type 3    Blurry vision, left eye  Resolved Problems:    * No resolved hospital problems. *     Secondary Diagnoses:   Past Medical History:   Diagnosis Date   • Cellulitis    • Colloid thyroid nodule     Right lobe   • CPRS 1 (complex regional pain syndrome I) of upper limb    • Dysautonomia (CMD) 09/2019    [facility] [place]   • Elza-Danlos syndrome 09/2019    [facility]   • Enlarged thyroid    • Esophageal reflux    • Failed moderate sedation during procedure     very emotional after anesthesia , crying and slow to wake.  Has Happened with all her surgeries .   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 She has history of hypertension, Elza Danlos syndrome amongst others as listed below. She was out dining with a friend who felt the left side of the face looked different from the right side. The patient denied any symptoms at the time. Whilst driving home, she noted left facial tingling and left arm heaviness but no weakness. She had no slurred speech or other focal deficit. She came to the ER  where she was almost back to normal. Per ER provider her NIH was 1 but my examination showed NIH of zero. She takes baby ASA. CT/CTA head and neck negative for any acute intracranial abnormality. She is being admitted for further evaluation and management for TIA. Patients symptoms resolved after several hours. Stroke workup was unremarkable. Patient is being discharged home with outpatient follow-up. Further details as below.     Transient left facial numbness & blurred vision, left eye   - CT head negative for acute infarct or intracranial hemorrhage  - CTA head/neck negative for any hemodynamically significant intracranial or extracranial vascular stenosis, occlusion, dissection or aneurysm.  - Neurology consulted (Guille)  - MRI brain negative for evidence of acute ischemia or hemorrhage.  - 2D-echocardiogram demonstrated normal left ventricular chamber size, wall thickness and systolic function with no regional wall motion abnormalities. LV EF 68%. No evidence of a shunt. No significant valve abnormalities.  - Prior echocardiogram in 2019 reported to have shown evidence of a PFO with interatrial shunt with left-to-right flow.  - Normal EKG, normal sinus rhythm.  - Troponin negative x 3  - EEG negative for any clear-cut epileptiform discharges ictal patterns were noted.  .  - LDL 95. Glycohemoglobin 5.6.  - Continue  mg, add Crestor 40 mg daily.  - Hypercoagulable lab work pending.    Essential hypertension  - Follows with cardiology for blood pressure management given PMH of Elza Danlos  - Continue home spironolactone & valsartan      Complex regional pain syndrome of right lower extremity  - Follows with [facility]  - Continue home Lyrica, tizanidine, and Norco    Elza-Danlos syndrome, type 3  - Monitor clinically      CONSULTS:  IP Consult Orders (From admission, onward)     Start     Ordered    10/31/23 0038  Inpatient consult to Neurology  ONE TIME        Provider:  (Not yet  assigned)    10/31/23 0037    10/30/23 2121  Inpatient consult to Neurology  ONE TIME        Provider:  Laura Han MD    10/30/23 2121                 PROCEDURES PERFORMED:     TRANSTHORACIC ECHO (TTE) COMPLETE W/ W/O IMAGING AGENT  Result Date: 10/31/2023  Final Impressions:  * Normal left ventricular chamber size, wall thickness and systolic function  with no regional wall motion abnormalities. LV EF 68 %.  * Normal right ventricular size and systolic function.  * Agitated saline was injected through a peripheral vein and did not show  evidence of a shunt.  * No significant valve abnormalities.    EEG   Result Date: 10/31/2023  No clear-cut epileptiform discharges ictal patterns were noted.      MRI BRAIN WO CONTRAST  Result Date: 10/31/2023  EXAM:  MRI BRAIN WO CONTRAST TECHNIQUE: Multiplanar multiecho MRI of the brain was performed without administration of intravenous contrast. HISTORY:  Neuro deficit, acute, stroke suspected, Other visual disturbances, Paresthesia of skin, Other symptoms and signs involving the musculoskeletal system COMPARISONS: Head CT and CTA of the head and neck done on 10/30/2023. FINDINGS: Diffusion weighted imaging is negative for acute restriction. The gray-white matter differentiation is well-maintained. No abnormal T2 or FLAIR signal seen within the brain parenchyma. The ventricles, sulci and cisterns are appropriate for the patient's chronologic age. Cisterna magna slightly prominent. No focal vasogenic edema. No mass lesion or mass effect. No extra-axial fluid collection or herniation. No hydrocephalus. No acute or chronic hemorrhage. Pituitary gland appears normal. Brainstem and cerebellum are within normal limits. Major dural sinuses demonstrate normal fluid signal on T2-weighted images. The calvarial bone marrow signal is within normal limits. The orbits and globes appear normal. The visualized paranasal sinuses and mastoid air cells are well-developed and aerated.    IMPRESSION: No evidence of acute ischemia as questioned. No acute or chronic hemorrhage. No other significant intracranial abnormality is seen. Specifically no abnormal T2 or FLAIR signal within the brain parenchyma. No extra-axial fluid collection mass effect or herniation. No hydrocephalus. Incidentally noted is minimal prominence of cisterna magna. A secure EPIC CHAT message was sent to NEELIMA WYATT on 10/31/2023 3:09 AM. Electronically Signed by: Scot Fam MD Signed on: 10/31/2023 3:13 AM Created on Workstation ID: [id number] Signed on Workstation ID: [id number]    CT HEAD LEVEL 1  Result Date: 10/30/2023  CT HEAD LEVEL 1, CTA HEAD AND NECK W CONTRAST LEVEL 1 CLINICAL INFORMATION:  42 years-old Female with history of Neuro deficit, acute, stroke suspected. COMPARISON: 11/2/2022. TECHNIQUE:  Using a multidetector, multislice helical scanner, routine noncontrast CT head was obtained, followed by CTA of the intracranial and extracranial circulation after administration of IV contrast.  Subsequently  MIP reconstructions are rendered and archived to PACS.  Artificial intelligence large vessel occlusion detection was applied to the CTA data. Determination of the degree of stenosis in the internal carotid arteries is obtained using measurements of distal internal carotid diameter as the denominator for stenosis measurement.  The method utilized is similar to that utilized in the [employer] Symptomatic Carotid Endarterectomy Trial (NASCET).  FINDINGS: CT HEAD WITHOUT CONTRAST:  No acute intracranial hemorrhage or abnormal extra-axial fluid collection. No CT evidence of an acute territorial vascular insult, however MRI is more sensitive. Brain morphology and volume are normal for age. No significant white matter abnormality. The imaged paranasal sinuses are clear. The mastoid air cells are clear. The osseous structures are unremarkable. -------------------------------------------------------------------  CTA NECK: Aortic Arch:  Patent without significant atherosclerosis.  The great vessels demonstrate standard anatomic branching pattern. Right Brachiocephalic Artery:  Patent. Right Subclavian Artery:  Patent. Left Subclavian Artery:  Patent. Right Common Carotid:  Patent. Right Internal Carotid:  Patent. Right External Carotid:  Patent. Left Common Carotid:  Patent. Left Internal Carotid:  Patent. Left External Carotid:  Patent. Vertebral dominance:  Codominant. Right Vertebral (V1-V3):  Patent origin.  Patent cervical segment. Left Vertebral (V1-V3):  Patent origin.  Patent cervical segment. ------------------------------------------------------------------- CTA HEAD: ANTERIOR CIRCULATION: Right ICA:  Patent. Right MCA:  Patent. Right RAEANN:  Patent. Left ICA:  Patent. Left MCA:  Patent. Left RAEANN:  Patent. POSTERIOR CIRCULATION: Distal Right Vertebral artery (V4):  Patent. Distal Left Vertebral artery (V4):  Patent. Basilar Artery:  Patent. Right PCA:  Patent. Left PCA:  Patent. PICA/AICA/SCA:  Patent. COLLATERAL CIRCULATION: Anterior communicator:  Patent. Right Posterior communicator:  Not well visualized, may be markedly diminutive or absent. Left Posterior communicator:  Patent, giving rise to fetal origin of ipsilateral posterior cerebral artery (normal anatomic variant). Additional findings: None.   IMPRESSION: CTA NECK:  No hemodynamically significant extracranial stenosis, occlusion or dissection. CTA HEAD:  No hemodynamically significant intracranial vascular stenosis, large vessel occlusion or aneurysm Results were called and discussed with Dr. Curiel over the telephone at 9:08 p.m. 10/30/2023. Electronically Signed by: Johnny Patel MD Signed on: 10/30/2023 9:13 PM Created on Workstation ID: [id number] Signed on Workstation ID: [id number]    CTA HEAD AND NECK W CONTRAST LEVEL 1  Result Date: 10/30/2023  CT HEAD LEVEL 1, CTA HEAD AND NECK W CONTRAST LEVEL 1 CLINICAL INFORMATION:  42 years-old Female with  history of Neuro deficit, acute, stroke suspected. COMPARISON: 11/2/2022. TECHNIQUE:  Using a multidetector, multislice helical scanner, routine noncontrast CT head was obtained, followed by CTA of the intracranial and extracranial circulation after administration of IV contrast.  Subsequently  MIP reconstructions are rendered and archived to PACS.  Artificial intelligence large vessel occlusion detection was applied to the CTA data. Determination of the degree of stenosis in the internal carotid arteries is obtained using measurements of distal internal carotid diameter as the denominator for stenosis measurement.  The method utilized is similar to that utilized in the [employer] Symptomatic Carotid Endarterectomy Trial (NASCET).  FINDINGS: CT HEAD WITHOUT CONTRAST:  No acute intracranial hemorrhage or abnormal extra-axial fluid collection. No CT evidence of an acute territorial vascular insult, however MRI is more sensitive. Brain morphology and volume are normal for age. No significant white matter abnormality. The imaged paranasal sinuses are clear. The mastoid air cells are clear. The osseous structures are unremarkable. ------------------------------------------------------------------- CTA NECK: Aortic Arch:  Patent without significant atherosclerosis.  The great vessels demonstrate standard anatomic branching pattern. Right Brachiocephalic Artery:  Patent. Right Subclavian Artery:  Patent. Left Subclavian Artery:  Patent. Right Common Carotid:  Patent. Right Internal Carotid:  Patent. Right External Carotid:  Patent. Left Common Carotid:  Patent. Left Internal Carotid:  Patent. Left External Carotid:  Patent. Vertebral dominance:  Codominant. Right Vertebral (V1-V3):  Patent origin.  Patent cervical segment. Left Vertebral (V1-V3):  Patent origin.  Patent cervical segment. ------------------------------------------------------------------- CTA HEAD: ANTERIOR CIRCULATION: Right ICA:  Patent. Right MCA:   Patent. Right RAEANN:  Patent. Left ICA:  Patent. Left MCA:  Patent. Left RAEANN:  Patent. POSTERIOR CIRCULATION: Distal Right Vertebral artery (V4):  Patent. Distal Left Vertebral artery (V4):  Patent. Basilar Artery:  Patent. Right PCA:  Patent. Left PCA:  Patent. PICA/AICA/SCA:  Patent. COLLATERAL CIRCULATION: Anterior communicator:  Patent. Right Posterior communicator:  Not well visualized, may be markedly diminutive or absent. Left Posterior communicator:  Patent, giving rise to fetal origin of ipsilateral posterior cerebral artery (normal anatomic variant). Additional findings: None.   IMPRESSION: CTA NECK:  No hemodynamically significant extracranial stenosis, occlusion or dissection. CTA HEAD:  No hemodynamically significant intracranial vascular stenosis, large vessel occlusion or aneurysm Results were called and discussed with Dr. Curiel over the telephone at 9:08 p.m. 10/30/2023. Electronically Signed by: Johnny Patel MD Signed on: 10/30/2023 9:13 PM Created on Workstation ID: [id number] Signed on Workstation ID: [id number]    DISCHARGE EXAM:   Blood pressure 122/88, pulse 95, temperature 98.2 °F (36.8 °C), temperature source Oral, resp. rate 18, height 5' 1\" (1.549 m), weight 95.3 kg (210 lb 1.6 oz), SpO2 97 %.     Constitutional: Appears stated age. Well-developed and well-nourished. Non-toxic appearance. No distress.   HEENT: Normocephalic, atraumatic. EOM and lids normal. Pupils equal, round, and reactive to light. Auditory acuity grossly intact. Dentition intact. Tongue midline.   Cardiovascular: Normal rate, regular rhythm and heart sounds. No murmur or friction rub. Telemetry SR, rate 80s-90s.  Pulmonary/Chest: Effort normal. No respiratory distress or accessory muscle use. Lungs clear to auscultation.   Abdomen: Soft, nontender, nondistended. + bowel sounds. No guarding or rebound.   Extremities/Skin: Warm and dry. No obvious lesions or wounds.   Neurological: Alert and oriented x 3. Cranial nerves  II-XII grossly intact. No focal deficits. Speech and language fluent. Muscle strength of upper and lower extremities 5/5. Sensory examination intact. Coordination intact per finger-nose test.  Psychiatric: Cooperative. Appropriate behavior, mood and affect. Normal judgement and insight for situation. Answers questions appropriately.     ACTIVITY: As tolerated    DIET: Regular    CODE STATUS: Full Resuscitation     PENDING ISSUES TO BE FOLLOWED UP BY PCP:  • Encourage follow-up with general neurology regarding results of hypercoagulable lab work.   • Ongoing management of chronic medical conditions.      DISCHARGE MEDICATIONS:     What to Do with Your Medications      UNREVIEWED Medications-Ask your doctor if you should continue these medications      Details   ALPRAZolam 0.5 MG tablet  Commonly known as: XANAX      Take 1 tablet by mouth 3 times daily as needed for Anxiety (with travel).  Authorizing Provider: Michelle Live NP     aspirin 81 MG chewable tablet      Chew 1 tablet by mouth daily.  Authorizing Provider: Alpa Caldwell CNP     carbidopa-levodopa  MG per tablet  Commonly known as: SINEMET  Ask about: Which instructions should I use?      TAKE 1 TABLET BY MOUTH IN THE MORNING AND IN THE EVENING  Authorizing Provider: Alpa Caldwell CNP     clemastine 2.68 MG Tab  Commonly known as: TAVIST      Take 1 tablet by mouth in the morning and 1 tablet in the evening.  Authorizing Provider: Chano Masterson DO     diclofenac 75 MG EC tablet  Commonly known as: VOLTAREN      Take 1 tablet by mouth in the morning and 1 tablet in the evening. Take with meals.  Authorizing Provider: Chano Masterson DO     DULoxetine 60 MG capsule  Commonly known as: CYMBALTA      Take 1 capsule by mouth daily. 90-day supply.  Authorizing Provider: Alpa Caldwell CNP  Comment: This is a 3 month supply.     HYDROcodone-acetaminophen 5-325 MG per tablet  Commonly known as: NORCO      Take 0.5-1 tablets by mouth  daily as needed for Pain. Ericka please make an appt this month for further refills  Authorizing Provider: Alpa Caldwell CNP  Comment: G90.521 complex regional pain syndrome;     levonorgestrel-ethinyl estradiol 0.15-30 MG-MCG per tablet  Commonly known as: Kurvelo      Take 1 tablet by mouth daily. Skip placebo pills, please allow for early refills due to skipping 7 days worth of placebo.  Authorizing Provider: Chano Masterson DO     Lyrica 200 MG capsule      Generic drug: pregabalin  Take 1 capsule by mouth in the morning and 1 capsule at noon and 1 capsule in the evening.  Authorizing Provider: Alpa Caldwell CNP     MULTIVITAMIN PO      Take  by mouth daily.     nortriptyline 50 MG capsule  Commonly known as: PAMELOR      Take 1 capsule by mouth nightly.  Authorizing Provider: Alpa Caldwell CNP     ondansetron 4 MG tablet  Commonly known as: ZOFRAN      Take 1 tablet by mouth every 8 hours as needed for Nausea.  Authorizing Provider: Alpa Caldwell CNP     phentermine 37.5 MG tablet  Commonly known as: ADIPEX-P  Ask about: Which instructions should I use?      Take 37.5 mg by mouth.     spironolactone 25 MG tablet  Commonly known as: ALDACTONE      Take 1 tablet by mouth daily.  Authorizing Provider: Sudhir Wooten PA-C     SUMAtriptan 50 MG tablet  Commonly known as: IMITREX      Take 1 tablet by mouth at onset of migraine. May repeat after 2 hours if needed.  Authorizing Provider: Alpa Caldwell CNP     tiZANidine 2 MG tablet  Commonly known as: ZANAFLEX      TAKE 1 TO 2 TABLETS BY MOUTH THREE TIMES DAILY AS NEEDED FOR MUSCLE SPASMS  Authorizing Provider: Alpa Caldwell CNP     valsartan 80 MG tablet  Commonly known as: DIOVAN      Take 1 tablet by mouth daily.  Authorizing Provider: Sudhir Wooten PA-C                       DISPOSITION: Patient is being discharged to home.    FOLLOW-UP WITH:  Chano Masterson DO  [address] [facility] [address]  [phone]    Follow up in  7 day(s)  Post hospital follow up within 7 days of discharge    Laura Han MD  [address] [address] [phone]    Follow up in 4 week(s)  Hospital follow-up       Time spent on discharge was greater than 30 minutes.    Signed:   YANELI Barnett  [facility]  10/31/2023  3:09 PM     HAs screening in process at [facility] on file at pharmacy

## 2023-12-21 NOTE — ED BEHAVIORAL HEALTH ASSESSMENT NOTE - REFERRED BY
BE    Caller: Matti Olguin     Topic/issue: Patient returned phone call.     Please advise.     Callback Number: [phone] (home)     Thank you,     Stacey VERNON     Self

## 2024-04-10 NOTE — ED ADULT TRIAGE NOTE - CHIEF COMPLAINT QUOTE
"I feel like I have HBP I am taking Norvasc 5mg and I think I need a higher dose. " Pt states he has a headache and feels dizzy. Obstetrics & Gynecology Post-Delivery Progress Note    Date of Service      16 y.o.  s/p vaginal, spontaneous  Delivery date: 24      Subjective  Pt reports feeling well, minimal pain, vital signs stable, vaginal bleeding is minimal.  She has no bowel movement but did have a bowel movement before presenting.  She is voiding spontaneously.  No complaints ambulating without lightheadedness.    Pain: Well controlled  Bleeding: lochia minimal  Tolerating PO: yes  Voiding: without difficulty  Ambulating: yes  Passing flatus: Yes, no bowel movement yet  Feeding: breastfeeding well      Objective  24hr VS:  Temp:  [36.1 °C (97 °F)-36.3 °C (97.4 °F)] 36.3 °C (97.4 °F)  Pulse:  [63-81] 63  Resp:  [18] 18  BP: ()/(57-69) 97/57  SpO2:  [96 %-98 %] 96 %    Physical Exam  Gen: well appearing, no apparent distress, resting comfortably in bed  CV: rrr, no m/r/g  Resp: CTAB, symmetric breath sounds  Abd: soft, nontender, nondistended  Fundus: firm and below umbilicus  Incision: not applicable, (vaginal delivery)  Ext: symmetric, no peripheral edema, calves nontender    Labs:  Recent Labs     24  0055 24  1350   WBC 15.1* 14.2*   RBC 4.10* 3.49*   HEMOGLOBIN 11.3* 9.7*   HEMATOCRIT 35.4* 30.0*   MCV 86.3 86.0   MCH 27.6 27.8   RDW 42.6 42.4   PLATELETCT 246 205   MPV 10.8 11.1   NEUTSPOLYS 83.50*  --    LYMPHOCYTES 10.10*  --    MONOCYTES 4.00  --    EOSINOPHILS 1.10  --    BASOPHILS 0.30  --          Medications  prenatal plus vitamin, 1 Tablet, Oral, Daily-0800      PRN medications: LR, docusate sodium, ibuprofen, acetaminophen, oxytocin, misoprostol, simethicone, calcium carbonate      Assessment/Plan  Nicholas Cardenas is a 16 y.o.yo  postpartum day #1  s/p vaginal, spontaneous. GBS unknown,  staying 48 hours. Mom progressing as expected.     - Post care: meeting all goals  -GBS unknown,  will stay for 48 hours otherwise mom is meeting post partum goals  - Pain: controlled  - Rh+,  Rubella Immune  - Method of Feeding: plans to breastfeed  - Method of Contraception:  Deciding  VTE prophylaxis: Low risk    - Disposition: likely home PPD2     Mickey Singh M.D.  PGY-1, [facility]

## 2024-09-24 NOTE — ED ADULT TRIAGE NOTE - ESI TRIAGE ACUITY LEVEL, MLM
OFFICE VISIT    Camille Sarah is a 51 year old female     Chief Complaint   Patient presents with    ER F/U     Patient evaluated in ER [facility] for depression. Patient is to follow up with [facility]       HPI  HPI  Pt with hx of anxiety and depression who hasn't been following with behavioral health for 3 years since her therapist  who came to [place] [facility] after having an argument with her son and felt overwhelmed and hopeless with thoughts of suicide.  She also had a lot to drink and alcohol level was 258.  She was in the ER for over 7 hours and did not see a  or other mental health counselor.  She was sent home on lorazepam and ambien with instructions to f/u at the [facility].  Past Medical History:   Diagnosis Date    Anxiety     counseling q 2weeks    Cervical cancer (CMD)     cancer cone bs partial cervix sx  NO RADIATION NO CHEM    Chest pain      stress test wnl    Chlamydia     venereal disea     Depression     counseling q 2weeks    Endometriosis     History of bladder infections     Pneumonia     PUD (peptic ulcer disease)            Past Surgical History:   Procedure Laterality Date    Bariatric surgery  2020    Gastric sleeve    Cervix surgery      Cervical CA    Cholecystectomy          Hysterectomy          Other surgical history      2011 mesh ovary        Current Outpatient Medications   Medication Sig Dispense Refill    LORazepam (ATIVAN) 1 MG tablet Take 0.5 tablets by mouth every 6 hours as needed for Anxiety. 6 tablet 0    zolpidem (AMBIEN) 5 MG tablet Take 1 tablet by mouth nightly as needed for Sleep. 7 tablet 0    Biotin 5 MG Cap Take 1 capsule by mouth daily.      Multiple Vitamins-Minerals (Opurity Bypass Optimized) Chew Tab        No current facility-administered medications for this visit.       ALLERGIES:   Allergen Reactions    Erythromycin Other (See Comments)     erythromyacin - Unknown  Cats- Unknown      Sulfa Antibiotics  Other (See Comments)     Unknown  Other reaction(s): Other (See Comments)  Unknown    No Name Available Other (See Comments)     erythromyacin - Unknown  Cats- Unknown    Plexion Cleanser Other (See Comments)       Family History   Problem Relation Age of Onset    Lung Disease Mother     Migraine Mother     Depression Mother     Heart disease Mother     Suicide Mother         hx of suicide attempt    Other Mother         history of alcoholism    Retinal detachment Mother     Dementia/Alzheimers Mother     Other Father         unknown    Patient is unaware of any medical problems Sister     Patient is unaware of any medical problems Sister     Patient is unaware of any medical problems Brother     Patient is unaware of any medical problems Brother     Patient is unaware of any medical problems Son     Patient is unaware of any medical problems Son     Hypertension Other        Social History     Socioeconomic History    Marital status: /Civil Union     Spouse name: Not on file    Number of children: Not on file    Years of education: Not on file    Highest education level: Not on file   Occupational History    Not on file   Tobacco Use    Smoking status: Some Days     Current packs/day: 0.00     Types: Cigarettes     Last attempt to quit: 11/1/2020     Years since quitting: 3.8    Smokeless tobacco: Never    Tobacco comments:     Are you a: current smoker    Vaping Use    Vaping status: never used   Substance and Sexual Activity    Alcohol use: Yes     Comment: SOCIAL    Drug use: Never    Sexual activity: Not on file   Other Topics Concern    Not on file   Social History Narrative    Not on file     Social Determinants of Health     Financial Resource Strain: Not on file   Food Insecurity: Not on file   Transportation Needs: Not on file   Physical Activity: Not on file   Stress: Not on file   Social Connections: Not on file   Interpersonal Safety: Not on file (12/3/2021)       Review of Systems    Constitutional:  Positive for fatigue. Negative for unexpected weight change.   HENT:  Negative for congestion and sinus pressure.    Respiratory:  Negative for cough and shortness of breath.    Cardiovascular:  Negative for chest pain and palpitations.   Gastrointestinal:  Negative for abdominal distention.   Psychiatric/Behavioral:  Positive for dysphoric mood. Negative for hallucinations and suicidal ideas. The patient is nervous/anxious.        Visit Vitals  BP (!) 144/68   Pulse 78   Resp 16   Ht 5' 5\" (1.651 m)   Wt 65.3 kg (144 lb)   LMP  (LMP Unknown)   SpO2 98%   BMI 23.96 kg/m²       Physical Exam  Vitals and nursing note reviewed.   Cardiovascular:      Rate and Rhythm: Normal rate and regular rhythm.      Heart sounds:      Friction rub present.   Pulmonary:      Effort: Pulmonary effort is normal.      Breath sounds: Normal breath sounds. No rales.   Musculoskeletal:      Right lower leg: No edema.   Neurological:      Mental Status: She is alert.         ASSESSMENT/PLAN  1. Severe episode of recurrent major depressive disorder, without psychotic features  (CMD)  Appt made with [facility] today.  Pt also with a contact in [place] with [employer].  Will get pt in touch with an [facility] mental health counselor as well.    2. Generalized anxiety disorder  Will need SSRI but likely will be started by psychiatry.              Reid Clarke MD   9/24/2024   4

## 2024-10-28 NOTE — ED ADULT TRIAGE NOTE - HEIGHT IN INCHES
[Left Leg] : left leg [5] : 5 [2] : 2 [de-identified] : JYOTI SPARKS a 40 year old female here for evaluation of her LT Foot. Patient states she woke up sometime during the week on 10/20/24 and took step and felt a dull pain in foot. Patient states the pain is along the 2nd 3rd and 4th MTP joints. Pt reports hx of two ORIF to left ankle. She denies previous treatment for this issue. WB sneakers     10

## 2025-01-30 ENCOUNTER — APPOINTMENT (OUTPATIENT)
Dept: HEPATOLOGY | Facility: CLINIC | Age: 55
End: 2025-01-30